# Patient Record
Sex: MALE | Race: WHITE | Employment: OTHER | URBAN - METROPOLITAN AREA
[De-identification: names, ages, dates, MRNs, and addresses within clinical notes are randomized per-mention and may not be internally consistent; named-entity substitution may affect disease eponyms.]

---

## 2017-03-09 ENCOUNTER — OFFICE VISIT (OUTPATIENT)
Dept: ORTHOPEDIC SURGERY | Age: 62
End: 2017-03-09

## 2017-03-09 VITALS
DIASTOLIC BLOOD PRESSURE: 88 MMHG | SYSTOLIC BLOOD PRESSURE: 127 MMHG | BODY MASS INDEX: 18.32 KG/M2 | HEART RATE: 58 BPM | WEIGHT: 128 LBS | HEIGHT: 70 IN

## 2017-03-09 DIAGNOSIS — Z96.642 STATUS POST TOTAL REPLACEMENT OF LEFT HIP: Primary | ICD-10-CM

## 2017-03-09 PROCEDURE — 99213 OFFICE O/P EST LOW 20 MIN: CPT | Performed by: ORTHOPAEDIC SURGERY

## 2017-03-09 PROCEDURE — 73502 X-RAY EXAM HIP UNI 2-3 VIEWS: CPT | Performed by: ORTHOPAEDIC SURGERY

## 2017-03-17 DIAGNOSIS — Z12.12 SCREENING FOR COLORECTAL CANCER: Primary | ICD-10-CM

## 2017-03-17 DIAGNOSIS — Z12.11 SCREENING FOR COLORECTAL CANCER: Primary | ICD-10-CM

## 2017-03-17 LAB
CONTROL: ABNORMAL
HEMOCCULT STL QL: POSITIVE

## 2017-03-20 DIAGNOSIS — Z12.11 SCREENING FOR COLORECTAL CANCER: ICD-10-CM

## 2017-03-20 DIAGNOSIS — Z12.12 SCREENING FOR COLORECTAL CANCER: ICD-10-CM

## 2017-03-20 PROCEDURE — 82274 ASSAY TEST FOR BLOOD FECAL: CPT | Performed by: FAMILY MEDICINE

## 2017-04-28 ENCOUNTER — HOSPITAL ENCOUNTER (OUTPATIENT)
Dept: ENDOSCOPY | Age: 62
Discharge: HOME | End: 2017-04-28
Attending: INTERNAL MEDICINE | Admitting: INTERNAL MEDICINE

## 2017-04-30 DIAGNOSIS — K64.8 INTERNAL HEMORRHOIDS: ICD-10-CM

## 2017-04-30 DIAGNOSIS — K57.30 DIVERTICULOSIS OF LARGE INTESTINE WITHOUT HEMORRHAGE: ICD-10-CM

## 2017-04-30 DIAGNOSIS — K63.5 COLON POLYPS: ICD-10-CM

## 2017-06-15 RX ORDER — ATENOLOL 50 MG/1
TABLET ORAL
Qty: 30 TABLET | Refills: 0 | Status: SHIPPED | OUTPATIENT
Start: 2017-06-15 | End: 2017-07-17 | Stop reason: SDUPTHER

## 2017-06-20 ENCOUNTER — OFFICE VISIT (OUTPATIENT)
Dept: FAMILY MEDICINE CLINIC | Age: 62
End: 2017-06-20

## 2017-06-20 VITALS
HEIGHT: 70 IN | WEIGHT: 136 LBS | DIASTOLIC BLOOD PRESSURE: 70 MMHG | BODY MASS INDEX: 19.47 KG/M2 | SYSTOLIC BLOOD PRESSURE: 110 MMHG

## 2017-06-20 DIAGNOSIS — G20 IDIOPATHIC PARKINSON'S DISEASE (HCC): ICD-10-CM

## 2017-06-20 DIAGNOSIS — I10 ESSENTIAL HYPERTENSION: Primary | ICD-10-CM

## 2017-06-20 LAB
ANION GAP SERPL CALCULATED.3IONS-SCNC: 14 MMOL/L (ref 3–16)
BUN BLDV-MCNC: 13 MG/DL (ref 7–20)
CALCIUM SERPL-MCNC: 9.3 MG/DL (ref 8.3–10.6)
CHLORIDE BLD-SCNC: 101 MMOL/L (ref 99–110)
CHOLESTEROL, TOTAL: 174 MG/DL (ref 0–199)
CO2: 27 MMOL/L (ref 21–32)
CREAT SERPL-MCNC: 0.8 MG/DL (ref 0.8–1.3)
GFR AFRICAN AMERICAN: >60
GFR NON-AFRICAN AMERICAN: >60
GLUCOSE BLD-MCNC: 124 MG/DL (ref 70–99)
HDLC SERPL-MCNC: 54 MG/DL (ref 40–60)
LDL CHOLESTEROL CALCULATED: 95 MG/DL
POTASSIUM SERPL-SCNC: 4.4 MMOL/L (ref 3.5–5.1)
SODIUM BLD-SCNC: 142 MMOL/L (ref 136–145)
TRIGL SERPL-MCNC: 124 MG/DL (ref 0–150)
VLDLC SERPL CALC-MCNC: 25 MG/DL

## 2017-06-20 PROCEDURE — 99213 OFFICE O/P EST LOW 20 MIN: CPT | Performed by: FAMILY MEDICINE

## 2017-06-20 PROCEDURE — 36415 COLL VENOUS BLD VENIPUNCTURE: CPT | Performed by: FAMILY MEDICINE

## 2017-06-20 ASSESSMENT — ENCOUNTER SYMPTOMS: SHORTNESS OF BREATH: 0

## 2017-06-21 ENCOUNTER — TELEPHONE (OUTPATIENT)
Dept: ORTHOPEDIC SURGERY | Age: 62
End: 2017-06-21

## 2017-06-21 RX ORDER — AMOXICILLIN 500 MG/1
500 TABLET, FILM COATED ORAL SEE ADMIN INSTRUCTIONS
Qty: 4 TABLET | Refills: 0 | Status: SHIPPED | OUTPATIENT
Start: 2017-06-21 | End: 2017-12-18

## 2017-06-22 RX ORDER — AMLODIPINE BESYLATE AND BENAZEPRIL HYDROCHLORIDE 5; 20 MG/1; MG/1
CAPSULE ORAL
Qty: 30 CAPSULE | Refills: 5 | Status: SHIPPED | OUTPATIENT
Start: 2017-06-22 | End: 2017-12-19 | Stop reason: SDUPTHER

## 2017-10-21 RX ORDER — ATENOLOL 50 MG/1
TABLET ORAL
Qty: 30 TABLET | Refills: 0 | Status: SHIPPED | OUTPATIENT
Start: 2017-10-21 | End: 2017-11-17 | Stop reason: SDUPTHER

## 2017-10-23 RX ORDER — AMOXICILLIN 500 MG/1
500 TABLET, FILM COATED ORAL SEE ADMIN INSTRUCTIONS
Qty: 4 TABLET | Refills: 0 | Status: SHIPPED | OUTPATIENT
Start: 2017-10-23 | End: 2018-06-21

## 2017-10-23 NOTE — TELEPHONE ENCOUNTER
Patient is going to the dentist tomorrow and will need an antibiotic.      Ph[facility]: [facility]  [phone]    Please call pt when completed  [phone]

## 2017-11-17 RX ORDER — ATENOLOL 50 MG/1
TABLET ORAL
Qty: 30 TABLET | Refills: 0 | Status: SHIPPED | OUTPATIENT
Start: 2017-11-17 | End: 2017-12-19 | Stop reason: SDUPTHER

## 2017-12-18 ASSESSMENT — ENCOUNTER SYMPTOMS: SHORTNESS OF BREATH: 0

## 2017-12-19 ENCOUNTER — OFFICE VISIT (OUTPATIENT)
Dept: FAMILY MEDICINE CLINIC | Age: 62
End: 2017-12-19

## 2017-12-19 VITALS
HEIGHT: 70 IN | DIASTOLIC BLOOD PRESSURE: 78 MMHG | SYSTOLIC BLOOD PRESSURE: 118 MMHG | BODY MASS INDEX: 18.78 KG/M2 | WEIGHT: 131.2 LBS

## 2017-12-19 DIAGNOSIS — G20 IDIOPATHIC PARKINSON'S DISEASE (HCC): ICD-10-CM

## 2017-12-19 DIAGNOSIS — I10 ESSENTIAL HYPERTENSION: Primary | ICD-10-CM

## 2017-12-19 DIAGNOSIS — Z23 NEED FOR INFLUENZA VACCINATION: ICD-10-CM

## 2017-12-19 PROCEDURE — 99213 OFFICE O/P EST LOW 20 MIN: CPT | Performed by: FAMILY MEDICINE

## 2017-12-19 RX ORDER — AMLODIPINE BESYLATE AND BENAZEPRIL HYDROCHLORIDE 5; 20 MG/1; MG/1
CAPSULE ORAL
Qty: 90 CAPSULE | Refills: 1 | Status: SHIPPED | OUTPATIENT
Start: 2017-12-19 | End: 2018-06-14 | Stop reason: SDUPTHER

## 2017-12-19 RX ORDER — ATENOLOL 50 MG/1
TABLET ORAL
Qty: 90 TABLET | Refills: 1 | Status: SHIPPED | OUTPATIENT
Start: 2017-12-19 | End: 2018-05-11 | Stop reason: SDUPTHER

## 2018-05-11 RX ORDER — ATENOLOL 50 MG/1
TABLET ORAL
Qty: 90 TABLET | Refills: 0 | Status: SHIPPED | OUTPATIENT
Start: 2018-05-11 | End: 2018-08-11 | Stop reason: SDUPTHER

## 2018-06-20 ASSESSMENT — ENCOUNTER SYMPTOMS: SHORTNESS OF BREATH: 0

## 2018-06-21 ENCOUNTER — OFFICE VISIT (OUTPATIENT)
Dept: FAMILY MEDICINE CLINIC | Age: 63
End: 2018-06-21

## 2018-06-21 VITALS
HEART RATE: 54 BPM | BODY MASS INDEX: 18.98 KG/M2 | WEIGHT: 132.6 LBS | HEIGHT: 70 IN | DIASTOLIC BLOOD PRESSURE: 79 MMHG | SYSTOLIC BLOOD PRESSURE: 123 MMHG

## 2018-06-21 DIAGNOSIS — I10 ESSENTIAL HYPERTENSION: Primary | ICD-10-CM

## 2018-06-21 DIAGNOSIS — G20 IDIOPATHIC PARKINSON'S DISEASE (HCC): ICD-10-CM

## 2018-06-21 DIAGNOSIS — L85.9 HYPERKERATOSIS: ICD-10-CM

## 2018-06-21 PROCEDURE — 99214 OFFICE O/P EST MOD 30 MIN: CPT | Performed by: FAMILY MEDICINE

## 2018-06-21 PROCEDURE — 17110 DESTRUCTION B9 LES UP TO 14: CPT | Performed by: FAMILY MEDICINE

## 2018-06-21 ASSESSMENT — PATIENT HEALTH QUESTIONNAIRE - PHQ9
2. FEELING DOWN, DEPRESSED OR HOPELESS: 0
SUM OF ALL RESPONSES TO PHQ QUESTIONS 1-9: 0
1. LITTLE INTEREST OR PLEASURE IN DOING THINGS: 0
SUM OF ALL RESPONSES TO PHQ9 QUESTIONS 1 & 2: 0

## 2018-06-22 LAB
ANION GAP SERPL CALCULATED.3IONS-SCNC: 13 MMOL/L (ref 3–16)
BUN BLDV-MCNC: 11 MG/DL (ref 7–20)
CALCIUM SERPL-MCNC: 9.5 MG/DL (ref 8.3–10.6)
CHLORIDE BLD-SCNC: 104 MMOL/L (ref 99–110)
CHOLESTEROL, TOTAL: 169 MG/DL (ref 0–199)
CO2: 26 MMOL/L (ref 21–32)
CREAT SERPL-MCNC: 0.8 MG/DL (ref 0.8–1.3)
GFR AFRICAN AMERICAN: >60
GFR NON-AFRICAN AMERICAN: >60
GLUCOSE BLD-MCNC: 98 MG/DL (ref 70–99)
HDLC SERPL-MCNC: 55 MG/DL (ref 40–60)
LDL CHOLESTEROL CALCULATED: 76 MG/DL
POTASSIUM SERPL-SCNC: 4.7 MMOL/L (ref 3.5–5.1)
SODIUM BLD-SCNC: 143 MMOL/L (ref 136–145)
TRIGL SERPL-MCNC: 192 MG/DL (ref 0–150)
VLDLC SERPL CALC-MCNC: 38 MG/DL

## 2018-08-11 RX ORDER — ATENOLOL 50 MG/1
TABLET ORAL
Qty: 90 TABLET | Refills: 0 | Status: SHIPPED | OUTPATIENT
Start: 2018-08-11 | End: 2018-11-06 | Stop reason: SDUPTHER

## 2018-09-07 RX ORDER — AMLODIPINE BESYLATE AND BENAZEPRIL HYDROCHLORIDE 5; 20 MG/1; MG/1
CAPSULE ORAL
Qty: 90 CAPSULE | Refills: 0 | Status: SHIPPED | OUTPATIENT
Start: 2018-09-07 | End: 2018-12-05 | Stop reason: SDUPTHER

## 2018-11-06 RX ORDER — ATENOLOL 50 MG/1
TABLET ORAL
Qty: 90 TABLET | Refills: 0 | Status: SHIPPED | OUTPATIENT
Start: 2018-11-06 | End: 2018-11-12

## 2018-11-12 RX ORDER — ATENOLOL 50 MG/1
TABLET ORAL
Qty: 90 TABLET | Refills: 0 | Status: SHIPPED | OUTPATIENT
Start: 2018-11-12 | End: 2019-02-10 | Stop reason: SDUPTHER

## 2018-12-17 ASSESSMENT — ENCOUNTER SYMPTOMS: SHORTNESS OF BREATH: 0

## 2018-12-18 ENCOUNTER — OFFICE VISIT (OUTPATIENT)
Dept: FAMILY MEDICINE CLINIC | Age: 63
End: 2018-12-18
Payer: COMMERCIAL

## 2018-12-18 VITALS
WEIGHT: 134 LBS | HEIGHT: 70 IN | DIASTOLIC BLOOD PRESSURE: 74 MMHG | BODY MASS INDEX: 19.18 KG/M2 | SYSTOLIC BLOOD PRESSURE: 116 MMHG

## 2018-12-18 DIAGNOSIS — I10 ESSENTIAL HYPERTENSION: Primary | ICD-10-CM

## 2018-12-18 DIAGNOSIS — G20 IDIOPATHIC PARKINSON'S DISEASE (HCC): ICD-10-CM

## 2018-12-18 DIAGNOSIS — Z23 NEED FOR INFLUENZA VACCINATION: ICD-10-CM

## 2018-12-18 PROCEDURE — 99213 OFFICE O/P EST LOW 20 MIN: CPT | Performed by: FAMILY MEDICINE

## 2019-02-10 RX ORDER — ATENOLOL 50 MG/1
TABLET ORAL
Qty: 90 TABLET | Refills: 1 | Status: SHIPPED | OUTPATIENT
Start: 2019-02-10 | End: 2019-08-03 | Stop reason: SDUPTHER

## 2019-03-04 RX ORDER — AMLODIPINE BESYLATE AND BENAZEPRIL HYDROCHLORIDE 5; 20 MG/1; MG/1
CAPSULE ORAL
Qty: 90 CAPSULE | Refills: 0 | Status: SHIPPED | OUTPATIENT
Start: 2019-03-04 | End: 2019-05-27 | Stop reason: SDUPTHER

## 2019-05-27 RX ORDER — AMLODIPINE BESYLATE AND BENAZEPRIL HYDROCHLORIDE 5; 20 MG/1; MG/1
CAPSULE ORAL
Qty: 90 CAPSULE | Refills: 0 | Status: SHIPPED | OUTPATIENT
Start: 2019-05-27 | End: 2019-08-30 | Stop reason: SDUPTHER

## 2019-06-11 RX ORDER — CARBIDOPA/LEVODOPA 25MG-250MG
1 TABLET ORAL 3 TIMES DAILY
COMMUNITY

## 2019-06-17 ASSESSMENT — ENCOUNTER SYMPTOMS: SHORTNESS OF BREATH: 0

## 2019-06-17 NOTE — PROGRESS NOTES
Subjective:      Patient ID: Shailesh Gamble is a 59 y.o. male. Hypertension   This is a chronic problem. The current episode started more than 1 year ago. The problem is unchanged. The problem is controlled. Pertinent negatives include no chest pain, palpitations, peripheral edema or shortness of breath. Risk factors for coronary artery disease include family history and male gender. Past treatments include ACE inhibitors, beta blockers and calcium channel blockers. The current treatment provides significant improvement. There are no compliance problems. Parkinson's Disease:  Patient sees Dr. Muna Thomas and continues to take Sinemet  QID, Carbidopa 25 mg TID. Review of Systems   Constitutional: Negative for chills and fever. Respiratory: Negative for shortness of breath. Cardiovascular: Negative for chest pain, palpitations and leg swelling. /70   Ht 5' 10\" (1.778 m)   Wt 131 lb (59.4 kg)   BMI 18.80 kg/m²    Objective:   Physical Exam   Constitutional: He is oriented to person, place, and time. He appears well-developed and well-nourished. No distress. HENT:   Head: Normocephalic. Right Ear: External ear normal.   Left Ear: External ear normal.   Mouth/Throat: Oropharynx is clear and moist. No oropharyngeal exudate. Neck: No JVD present. No thyromegaly present. Cardiovascular: Normal rate, regular rhythm, normal heart sounds and intact distal pulses. No murmur heard. Pulmonary/Chest: Effort normal and breath sounds normal. He has no wheezes. He has no rales. Musculoskeletal: He exhibits no edema. Lymphadenopathy:     He has no cervical adenopathy. Neurological: He is alert and oriented to person, place, and time.        Assessment:      Hypertension  Parkinson's Disease      Plan:       Chem 7, Lipid Panel  Refilled medications  RTO 6 months for Hypertension

## 2019-06-18 ENCOUNTER — OFFICE VISIT (OUTPATIENT)
Dept: FAMILY MEDICINE CLINIC | Age: 64
End: 2019-06-18
Payer: COMMERCIAL

## 2019-06-18 VITALS
DIASTOLIC BLOOD PRESSURE: 70 MMHG | WEIGHT: 131 LBS | BODY MASS INDEX: 18.75 KG/M2 | SYSTOLIC BLOOD PRESSURE: 104 MMHG | HEIGHT: 70 IN

## 2019-06-18 DIAGNOSIS — G20 IDIOPATHIC PARKINSON'S DISEASE (HCC): ICD-10-CM

## 2019-06-18 DIAGNOSIS — I10 ESSENTIAL HYPERTENSION: Primary | ICD-10-CM

## 2019-06-18 LAB
ANION GAP SERPL CALCULATED.3IONS-SCNC: 13 MMOL/L (ref 3–16)
BUN BLDV-MCNC: 12 MG/DL (ref 7–20)
CALCIUM SERPL-MCNC: 9.5 MG/DL (ref 8.3–10.6)
CHLORIDE BLD-SCNC: 101 MMOL/L (ref 99–110)
CHOLESTEROL, TOTAL: 169 MG/DL (ref 0–199)
CO2: 25 MMOL/L (ref 21–32)
CREAT SERPL-MCNC: 0.7 MG/DL (ref 0.8–1.3)
GFR AFRICAN AMERICAN: >60
GFR NON-AFRICAN AMERICAN: >60
GLUCOSE BLD-MCNC: 100 MG/DL (ref 70–99)
HDLC SERPL-MCNC: 55 MG/DL (ref 40–60)
LDL CHOLESTEROL CALCULATED: 88 MG/DL
POTASSIUM SERPL-SCNC: 4.7 MMOL/L (ref 3.5–5.1)
SODIUM BLD-SCNC: 139 MMOL/L (ref 136–145)
TRIGL SERPL-MCNC: 128 MG/DL (ref 0–150)
VLDLC SERPL CALC-MCNC: 26 MG/DL

## 2019-06-18 PROCEDURE — 99214 OFFICE O/P EST MOD 30 MIN: CPT | Performed by: FAMILY MEDICINE

## 2019-09-05 ENCOUNTER — OFFICE VISIT (OUTPATIENT)
Dept: FAMILY MEDICINE CLINIC | Age: 64
End: 2019-09-05
Payer: COMMERCIAL

## 2019-09-05 VITALS
BODY MASS INDEX: 18.04 KG/M2 | DIASTOLIC BLOOD PRESSURE: 74 MMHG | HEIGHT: 70 IN | HEART RATE: 62 BPM | SYSTOLIC BLOOD PRESSURE: 112 MMHG | WEIGHT: 126 LBS

## 2019-09-05 DIAGNOSIS — K64.4 EXTERNAL HEMORRHOIDS: Primary | ICD-10-CM

## 2019-09-05 PROCEDURE — 99213 OFFICE O/P EST LOW 20 MIN: CPT | Performed by: FAMILY MEDICINE

## 2019-09-05 RX ORDER — HYDROCORTISONE ACETATE 25 MG/1
25 SUPPOSITORY RECTAL EVERY 12 HOURS
Qty: 14 SUPPOSITORY | Refills: 1 | Status: SHIPPED | OUTPATIENT
Start: 2019-09-05 | End: 2019-09-19

## 2019-09-05 ASSESSMENT — ENCOUNTER SYMPTOMS
ANAL BLEEDING: 1
RECTAL PAIN: 1

## 2019-12-16 ASSESSMENT — ENCOUNTER SYMPTOMS: SHORTNESS OF BREATH: 0

## 2019-12-18 ENCOUNTER — OFFICE VISIT (OUTPATIENT)
Dept: FAMILY MEDICINE CLINIC | Age: 64
End: 2019-12-18
Payer: COMMERCIAL

## 2019-12-18 VITALS
DIASTOLIC BLOOD PRESSURE: 80 MMHG | HEIGHT: 70 IN | BODY MASS INDEX: 18.87 KG/M2 | WEIGHT: 131.8 LBS | SYSTOLIC BLOOD PRESSURE: 114 MMHG

## 2019-12-18 DIAGNOSIS — Z23 NEED FOR INFLUENZA VACCINATION: ICD-10-CM

## 2019-12-18 DIAGNOSIS — G20 IDIOPATHIC PARKINSON'S DISEASE (HCC): ICD-10-CM

## 2019-12-18 DIAGNOSIS — I10 ESSENTIAL HYPERTENSION: Primary | ICD-10-CM

## 2019-12-18 PROCEDURE — 99213 OFFICE O/P EST LOW 20 MIN: CPT | Performed by: FAMILY MEDICINE

## 2019-12-18 RX ORDER — AMLODIPINE BESYLATE AND BENAZEPRIL HYDROCHLORIDE 5; 20 MG/1; MG/1
CAPSULE ORAL
Qty: 90 CAPSULE | Refills: 1 | Status: SHIPPED | OUTPATIENT
Start: 2019-12-18 | End: 2020-06-01

## 2020-01-24 RX ORDER — ATENOLOL 50 MG/1
TABLET ORAL
Qty: 90 TABLET | Refills: 0 | Status: SHIPPED | OUTPATIENT
Start: 2020-01-24 | End: 2020-04-19

## 2020-06-14 ASSESSMENT — ENCOUNTER SYMPTOMS: SHORTNESS OF BREATH: 0

## 2020-06-16 ENCOUNTER — OFFICE VISIT (OUTPATIENT)
Dept: FAMILY MEDICINE CLINIC | Age: 65
End: 2020-06-16
Payer: COMMERCIAL

## 2020-06-16 VITALS
DIASTOLIC BLOOD PRESSURE: 74 MMHG | HEIGHT: 70 IN | WEIGHT: 131.8 LBS | SYSTOLIC BLOOD PRESSURE: 116 MMHG | BODY MASS INDEX: 18.87 KG/M2

## 2020-06-16 LAB
ANION GAP SERPL CALCULATED.3IONS-SCNC: 12 MMOL/L (ref 3–16)
BUN BLDV-MCNC: 15 MG/DL (ref 7–20)
CALCIUM SERPL-MCNC: 9.1 MG/DL (ref 8.3–10.6)
CHLORIDE BLD-SCNC: 100 MMOL/L (ref 99–110)
CHOLESTEROL, TOTAL: 147 MG/DL (ref 0–199)
CO2: 27 MMOL/L (ref 21–32)
CREAT SERPL-MCNC: 0.8 MG/DL (ref 0.8–1.3)
GFR AFRICAN AMERICAN: >60
GFR NON-AFRICAN AMERICAN: >60
GLUCOSE BLD-MCNC: 85 MG/DL (ref 70–99)
HDLC SERPL-MCNC: 54 MG/DL (ref 40–60)
LDL CHOLESTEROL CALCULATED: 76 MG/DL
POTASSIUM SERPL-SCNC: 4.4 MMOL/L (ref 3.5–5.1)
SODIUM BLD-SCNC: 139 MMOL/L (ref 136–145)
TRIGL SERPL-MCNC: 84 MG/DL (ref 0–150)
VLDLC SERPL CALC-MCNC: 17 MG/DL

## 2020-06-16 PROCEDURE — 99214 OFFICE O/P EST MOD 30 MIN: CPT | Performed by: FAMILY MEDICINE

## 2020-07-12 RX ORDER — ATENOLOL 50 MG/1
TABLET ORAL
Qty: 90 TABLET | Refills: 0 | Status: SHIPPED | OUTPATIENT
Start: 2020-07-12 | End: 2020-10-07

## 2020-12-15 ASSESSMENT — ENCOUNTER SYMPTOMS: SHORTNESS OF BREATH: 0

## 2020-12-16 ENCOUNTER — OFFICE VISIT (OUTPATIENT)
Dept: FAMILY MEDICINE CLINIC | Age: 65
End: 2020-12-16
Payer: COMMERCIAL

## 2020-12-16 VITALS
TEMPERATURE: 97 F | WEIGHT: 128 LBS | HEIGHT: 70 IN | DIASTOLIC BLOOD PRESSURE: 72 MMHG | SYSTOLIC BLOOD PRESSURE: 114 MMHG | BODY MASS INDEX: 18.32 KG/M2

## 2020-12-16 PROCEDURE — 99213 OFFICE O/P EST LOW 20 MIN: CPT | Performed by: FAMILY MEDICINE

## 2020-12-16 RX ORDER — RASAGILINE 1 MG/1
0.5 TABLET ORAL DAILY
COMMUNITY

## 2020-12-16 RX ORDER — ATENOLOL 50 MG/1
50 TABLET ORAL DAILY
Qty: 90 TABLET | Refills: 1 | Status: SHIPPED | OUTPATIENT
Start: 2020-12-16 | End: 2021-06-15 | Stop reason: SDUPTHER

## 2020-12-16 RX ORDER — AMLODIPINE BESYLATE AND BENAZEPRIL HYDROCHLORIDE 5; 20 MG/1; MG/1
1 CAPSULE ORAL DAILY
Qty: 90 CAPSULE | Refills: 1 | Status: SHIPPED | OUTPATIENT
Start: 2020-12-16 | End: 2021-06-15 | Stop reason: SDUPTHER

## 2021-06-14 ASSESSMENT — ENCOUNTER SYMPTOMS: SHORTNESS OF BREATH: 0

## 2021-06-15 ENCOUNTER — OFFICE VISIT (OUTPATIENT)
Dept: FAMILY MEDICINE CLINIC | Age: 66
End: 2021-06-15
Payer: COMMERCIAL

## 2021-06-15 VITALS
BODY MASS INDEX: 18.3 KG/M2 | WEIGHT: 127.8 LBS | SYSTOLIC BLOOD PRESSURE: 122 MMHG | OXYGEN SATURATION: 98 % | HEART RATE: 70 BPM | HEIGHT: 70 IN | DIASTOLIC BLOOD PRESSURE: 68 MMHG | RESPIRATION RATE: 16 BRPM

## 2021-06-15 DIAGNOSIS — G20 IDIOPATHIC PARKINSON'S DISEASE (HCC): ICD-10-CM

## 2021-06-15 DIAGNOSIS — I10 ESSENTIAL HYPERTENSION: Primary | ICD-10-CM

## 2021-06-15 LAB
ANION GAP SERPL CALCULATED.3IONS-SCNC: 10 MMOL/L (ref 3–16)
BUN BLDV-MCNC: 11 MG/DL (ref 7–20)
CALCIUM SERPL-MCNC: 9.2 MG/DL (ref 8.3–10.6)
CHLORIDE BLD-SCNC: 103 MMOL/L (ref 99–110)
CHOLESTEROL, TOTAL: 176 MG/DL (ref 0–199)
CO2: 27 MMOL/L (ref 21–32)
CREAT SERPL-MCNC: 0.8 MG/DL (ref 0.8–1.3)
GFR AFRICAN AMERICAN: >60
GFR NON-AFRICAN AMERICAN: >60
GLUCOSE BLD-MCNC: 91 MG/DL (ref 70–99)
HDLC SERPL-MCNC: 62 MG/DL (ref 40–60)
LDL CHOLESTEROL CALCULATED: 91 MG/DL
POTASSIUM SERPL-SCNC: 4.5 MMOL/L (ref 3.5–5.1)
SODIUM BLD-SCNC: 140 MMOL/L (ref 136–145)
TRIGL SERPL-MCNC: 116 MG/DL (ref 0–150)
VLDLC SERPL CALC-MCNC: 23 MG/DL

## 2021-06-15 PROCEDURE — 99214 OFFICE O/P EST MOD 30 MIN: CPT | Performed by: FAMILY MEDICINE

## 2021-06-15 RX ORDER — ATENOLOL 50 MG/1
50 TABLET ORAL DAILY
Qty: 90 TABLET | Refills: 1 | Status: SHIPPED | OUTPATIENT
Start: 2021-06-15 | End: 2021-11-29

## 2021-06-15 RX ORDER — AMLODIPINE BESYLATE AND BENAZEPRIL HYDROCHLORIDE 5; 20 MG/1; MG/1
1 CAPSULE ORAL DAILY
Qty: 90 CAPSULE | Refills: 1 | Status: SHIPPED | OUTPATIENT
Start: 2021-06-15 | End: 2021-11-29

## 2021-06-15 SDOH — ECONOMIC STABILITY: FOOD INSECURITY: WITHIN THE PAST 12 MONTHS, THE FOOD YOU BOUGHT JUST DIDN'T LAST AND YOU DIDN'T HAVE MONEY TO GET MORE.: NEVER TRUE

## 2021-06-15 SDOH — ECONOMIC STABILITY: FOOD INSECURITY: WITHIN THE PAST 12 MONTHS, YOU WORRIED THAT YOUR FOOD WOULD RUN OUT BEFORE YOU GOT MONEY TO BUY MORE.: NEVER TRUE

## 2021-06-15 ASSESSMENT — SOCIAL DETERMINANTS OF HEALTH (SDOH): HOW HARD IS IT FOR YOU TO PAY FOR THE VERY BASICS LIKE FOOD, HOUSING, MEDICAL CARE, AND HEATING?: NOT HARD AT ALL

## 2021-08-16 ENCOUNTER — TELEPHONE (OUTPATIENT)
Dept: FAMILY MEDICINE CLINIC | Age: 66
End: 2021-08-16

## 2021-08-16 NOTE — TELEPHONE ENCOUNTER
----- Message from Candace Vences sent at 8/16/2021 12:15 PM EDT -----  Subject: Appointment Request    Reason for Call: Urgent Skin Problem    QUESTIONS  Type of Appointment? Established Patient  Reason for appointment request? Requested Provider unavailable - Meghann Cerda  Additional Information for Provider? Patient has a mole on his right arm   that he would like Dr. Ira Patel to take a look at to make sure it isn't   anything serious.   ---------------------------------------------------------------------------  --------------  CALL BACK INFO  What is the best way for the office to contact you? OK to leave message on   voicemail  Preferred Call Back Phone Number? [phone]  ---------------------------------------------------------------------------  --------------  SCRIPT ANSWERS  Relationship to Patient? Self  Are you having swelling in your throat or face? No  Are you having difficulty breathing? No  Have the symptoms worsened or spread in the last day? Yes  Have you been diagnosed with, awaiting test results for, or told that you   are suspected of having COVID-19 (Coronavirus)? (If patient has tested   negative or was tested as a requirement for work, school, or travel and   not based on symptoms, answer no)? No  Do you currently have flu-like symptoms including fever or chills, cough,   shortness of breath, difficulty breathing, or new loss of taste or smell? No  Have you had close contact with someone with COVID-19 in the last 14 days? No  (Service Expert  click yes below to proceed with [employer] As Usual   Scheduling)?  Yes

## 2021-08-23 NOTE — PROGRESS NOTES
Subjective:      Patient ID: Luis Gunter is a 73 y.o. male. HPI     Mole on Left Forearm:  Patient found a spot on the left forearm that is raised. He feels that it is improving over time but it is \"crusty\" and he wanted to have it checked. It itches slightly but is not draining or bleeding. Review of Systems   Constitutional: Negative for chills and fever. Skin: Positive for wound. /76   Pulse 64   Resp 18   Ht 5' 10\" (1.778 m)   Wt 122 lb (55.3 kg)   SpO2 97%   BMI 17.51 kg/m²    Objective:   Physical Exam  Constitutional:       General: He is not in acute distress. Appearance: Normal appearance. He is normal weight. He is not ill-appearing or toxic-appearing. Skin:     Comments: Left dorsal forearm with a 0.25 cm raised, scaled lesion consistent with actinic keratosis. Neurological:      Mental Status: He is alert and oriented to person, place, and time. Assessment:      Actinic Keratosis       Plan:      Cryotherapy applied for 40 seconds.     Pneumovax Shot Given  Abdominal US for AAA screen  RTO 4 months for Hypertension         ALYSA JUNIOR DO

## 2021-08-24 ENCOUNTER — OFFICE VISIT (OUTPATIENT)
Dept: FAMILY MEDICINE CLINIC | Age: 66
End: 2021-08-24
Payer: COMMERCIAL

## 2021-08-24 VITALS
SYSTOLIC BLOOD PRESSURE: 122 MMHG | DIASTOLIC BLOOD PRESSURE: 76 MMHG | HEIGHT: 70 IN | BODY MASS INDEX: 17.47 KG/M2 | OXYGEN SATURATION: 97 % | RESPIRATION RATE: 18 BRPM | HEART RATE: 64 BPM | WEIGHT: 122 LBS

## 2021-08-24 DIAGNOSIS — Z13.6 SCREENING FOR AAA (ABDOMINAL AORTIC ANEURYSM): ICD-10-CM

## 2021-08-24 DIAGNOSIS — Z23 NEED FOR PNEUMOCOCCAL VACCINATION: ICD-10-CM

## 2021-08-24 DIAGNOSIS — L57.0 ACTINIC KERATOSIS: Primary | ICD-10-CM

## 2021-08-24 PROCEDURE — 99213 OFFICE O/P EST LOW 20 MIN: CPT | Performed by: FAMILY MEDICINE

## 2021-08-24 PROCEDURE — G0009 ADMIN PNEUMOCOCCAL VACCINE: HCPCS | Performed by: FAMILY MEDICINE

## 2021-08-24 PROCEDURE — 90732 PPSV23 VACC 2 YRS+ SUBQ/IM: CPT | Performed by: FAMILY MEDICINE

## 2021-08-24 PROCEDURE — 17000 DESTRUCT PREMALG LESION: CPT | Performed by: FAMILY MEDICINE

## 2021-08-24 NOTE — PATIENT INSTRUCTIONS
Patient Education        Actinic Keratosis: Care Instructions  Your Care Instructions  Actinic keratosis is a skin growth caused by sun damage. It can turn into skin cancer, but this isn't common. Actinic keratoses, also called solar keratoses, are small red, brown, or skin-colored scaly patches. They are most common on the face, neck, hands, and forearms. Your doctor can remove these growths by freezing or scraping them off or by putting medicines on them. Follow-up care is a key part of your treatment and safety. Be sure to make and go to all appointments, and call your doctor if you are having problems. It's also a good idea to know your test results and keep a list of the medicines you take. How can you care for yourself at home? · If your doctor told you how to care for the treated area, follow your doctor's instructions. If you did not get instructions, follow this general advice:  ? Wash around the area with clean water 2 times a day. Don't use hydrogen peroxide or alcohol, which can slow healing. ? You may cover the area with a thin layer of petroleum jelly, such as Vaseline, and a nonstick bandage. ? Apply more petroleum jelly and replace the bandage as needed. To prevent actinic keratosis  · Always wear sunscreen on exposed skin. Make sure to use a broad-spectrum sunscreen that has a sun protection factor (SPF) of 30 or higher. Use it every day, even when it is cloudy. · Wear long sleeves, a hat, and pants if you are going to be outdoors for a long time. · Avoid the sun between 10 a.m. and 4 p.m., the peak time for UV rays. · Do not use tanning booths or sunlamps. When should you call for help? Watch closely for changes in your health, and be sure to contact your doctor if:    · You have symptoms of infection, such as:  ? Increased pain, swelling, warmth, or redness. ? Red streaks leading from the area. ? Pus draining from the area. ? A fever. Where can you learn more?   Go to [email or web address] and sign in to your [employer] account. Enter L364 in the [facility] box to learn more about \"Actinic Keratosis: Care Instructions. \"     If you do not have an account, please click on the \"Sign Up Now\" link. Current as of: March 3, 2021               Content Version: 12.9  © 7683-6153 [employer], [facility]. Care instructions adapted under license by [facility] ([facility]). If you have questions about a medical condition or this instruction, always ask your healthcare professional. [place] [address] any warranty or liability for your use of this information.

## 2021-12-13 ASSESSMENT — ENCOUNTER SYMPTOMS: SHORTNESS OF BREATH: 0

## 2021-12-13 NOTE — PROGRESS NOTES
Subjective:      Patient ID: Brittany Nam is a 73 y.o. male. Hypertension  This is a chronic problem. The current episode started more than 1 year ago. The problem is unchanged. The problem is controlled. Pertinent negatives include no chest pain, palpitations, peripheral edema or shortness of breath. Risk factors for coronary artery disease include family history and male gender. Past treatments include ACE inhibitors, beta blockers and calcium channel blockers. The current treatment provides significant improvement. There are no compliance problems. Parkinson's Disease:  Patient sees Dr. Rody Benitze and continues to take Sinemet  QID and Rasagiline (Azilect) 1 mg 1/2 daily. Review of Systems   Constitutional: Negative for chills and fever. Respiratory: Negative for shortness of breath. Cardiovascular: Negative for chest pain, palpitations and leg swelling. /78   Pulse 61   Wt 131 lb (59.4 kg)   SpO2 99%   BMI 18.80 kg/m²    Objective:   Physical Exam  Constitutional:       General: He is not in acute distress. Appearance: He is well-developed. HENT:      Head: Normocephalic. Right Ear: External ear normal.      Left Ear: External ear normal.      Mouth/Throat:      Pharynx: No oropharyngeal exudate. Neck:      Thyroid: No thyromegaly. Vascular: No JVD. Cardiovascular:      Rate and Rhythm: Normal rate and regular rhythm. Heart sounds: Normal heart sounds. No murmur heard. Pulmonary:      Effort: Pulmonary effort is normal.      Breath sounds: Normal breath sounds. No wheezing or rales. Lymphadenopathy:      Cervical: No cervical adenopathy. Neurological:      Mental Status: He is alert and oriented to person, place, and time.          Assessment:      Hypertension  Parkinson's Disease      Plan:       Refilled medications  Flu Shot Declined   Abdominal US for AAA screen   RTO 6 months for Hypertension

## 2021-12-14 ENCOUNTER — OFFICE VISIT (OUTPATIENT)
Dept: FAMILY MEDICINE CLINIC | Age: 66
End: 2021-12-14
Payer: COMMERCIAL

## 2021-12-14 VITALS
OXYGEN SATURATION: 99 % | WEIGHT: 131 LBS | DIASTOLIC BLOOD PRESSURE: 78 MMHG | SYSTOLIC BLOOD PRESSURE: 110 MMHG | BODY MASS INDEX: 18.8 KG/M2 | HEART RATE: 61 BPM

## 2021-12-14 DIAGNOSIS — L98.9 FACIAL LESION: ICD-10-CM

## 2021-12-14 DIAGNOSIS — Z23 NEED FOR INFLUENZA VACCINATION: ICD-10-CM

## 2021-12-14 DIAGNOSIS — Z13.6 SCREENING FOR AAA (ABDOMINAL AORTIC ANEURYSM): ICD-10-CM

## 2021-12-14 DIAGNOSIS — I10 ESSENTIAL HYPERTENSION: Primary | ICD-10-CM

## 2021-12-14 DIAGNOSIS — G20 IDIOPATHIC PARKINSON'S DISEASE (HCC): ICD-10-CM

## 2021-12-14 PROCEDURE — 99213 OFFICE O/P EST LOW 20 MIN: CPT | Performed by: FAMILY MEDICINE

## 2021-12-14 RX ORDER — CARBIDOPA/LEVODOPA 25MG-250MG
1 TABLET ORAL 4 TIMES DAILY
Qty: 90 TABLET | Status: CANCELLED | OUTPATIENT
Start: 2021-12-14

## 2022-01-06 ENCOUNTER — HOSPITAL ENCOUNTER (OUTPATIENT)
Dept: ULTRASOUND IMAGING | Age: 67
Discharge: HOME | End: 2022-01-06
Payer: MEDICARE

## 2022-01-06 DIAGNOSIS — Z13.6 SCREENING FOR AAA (ABDOMINAL AORTIC ANEURYSM): ICD-10-CM

## 2022-01-06 PROCEDURE — 76706 US ABDL AORTA SCREEN AAA: CPT

## 2022-02-22 RX ORDER — ATENOLOL 50 MG/1
TABLET ORAL
Qty: 90 TABLET | Refills: 0 | Status: SHIPPED | OUTPATIENT
Start: 2022-02-22 | End: 2022-05-22

## 2022-03-20 RX ORDER — AMLODIPINE BESYLATE AND BENAZEPRIL HYDROCHLORIDE 5; 20 MG/1; MG/1
CAPSULE ORAL
Qty: 90 CAPSULE | Refills: 0 | Status: SHIPPED | OUTPATIENT
Start: 2022-03-20 | End: 2022-06-14 | Stop reason: SDUPTHER

## 2022-04-03 NOTE — PROGRESS NOTES
Subjective:      Patient ID: Luke Hernandez is a 73 y.o. male. HPI     Hip Pain:  Patient has had hip pain for years but worse over the last year and especially the last 2 months. He states that about 2 weeks ago, he slipped while working in the yard and made it even worse. He has been taking Ibuprofen 800 mg for temporary relief. He will take Tylenol on occasion as well. His left hip was replaced in 2016. He was previously told that his right hip is bone on bone. Review of Systems   Constitutional: Negative for chills and fever. Musculoskeletal: Positive for arthralgias. /82   Ht 5' 10\" (1.778 m)   Wt 130 lb 12.8 oz (59.3 kg)   BMI 18.77 kg/m²    Objective:   Physical Exam  Constitutional:       General: He is not in acute distress. Appearance: Normal appearance. He is not ill-appearing or toxic-appearing. Musculoskeletal:      Comments: Right hip with no pain to palpation. Pain with extremes of flexion, abduction and internal / external rotation. Neurological:      Mental Status: He is alert and oriented to person, place, and time. Assessment:      Right Hip Pain       Plan:      Rx Ultram 50 mg every 6 hrs as needed for pain (#60)  Rx Ibuprofen 800 mg TID prn  Referral back to Dr. Tania Cline for probable hip replacement. I recommended the COVID booster  Recommended that patient have an AWV.    RTO 3 months for Hypertension         ALYSA JUNIOR DO

## 2022-04-04 ENCOUNTER — OFFICE VISIT (OUTPATIENT)
Dept: FAMILY MEDICINE CLINIC | Age: 67
End: 2022-04-04
Payer: MEDICARE

## 2022-04-04 VITALS
SYSTOLIC BLOOD PRESSURE: 110 MMHG | WEIGHT: 130.8 LBS | HEIGHT: 70 IN | DIASTOLIC BLOOD PRESSURE: 82 MMHG | BODY MASS INDEX: 18.73 KG/M2

## 2022-04-04 DIAGNOSIS — M16.11 PRIMARY OSTEOARTHRITIS OF RIGHT HIP: Primary | ICD-10-CM

## 2022-04-04 PROCEDURE — 99213 OFFICE O/P EST LOW 20 MIN: CPT | Performed by: FAMILY MEDICINE

## 2022-04-04 RX ORDER — TRAMADOL HYDROCHLORIDE 50 MG/1
50 TABLET ORAL EVERY 6 HOURS PRN
Qty: 60 TABLET | Refills: 0 | Status: SHIPPED | OUTPATIENT
Start: 2022-04-04 | End: 2022-04-13

## 2022-04-04 RX ORDER — IBUPROFEN 800 MG/1
800 TABLET ORAL
Qty: 90 TABLET | Refills: 0 | Status: ON HOLD | OUTPATIENT
Start: 2022-04-04 | End: 2022-04-25 | Stop reason: SDUPTHER

## 2022-04-04 ASSESSMENT — PATIENT HEALTH QUESTIONNAIRE - PHQ9
10. IF YOU CHECKED OFF ANY PROBLEMS, HOW DIFFICULT HAVE THESE PROBLEMS MADE IT FOR YOU TO DO YOUR WORK, TAKE CARE OF THINGS AT HOME, OR GET ALONG WITH OTHER PEOPLE: 3
SUM OF ALL RESPONSES TO PHQ9 QUESTIONS 1 & 2: 0
2. FEELING DOWN, DEPRESSED OR HOPELESS: 0
6. FEELING BAD ABOUT YOURSELF - OR THAT YOU ARE A FAILURE OR HAVE LET YOURSELF OR YOUR FAMILY DOWN: 0
9. THOUGHTS THAT YOU WOULD BE BETTER OFF DEAD, OR OF HURTING YOURSELF: 0
3. TROUBLE FALLING OR STAYING ASLEEP: 1
SUM OF ALL RESPONSES TO PHQ QUESTIONS 1-9: 3
7. TROUBLE CONCENTRATING ON THINGS, SUCH AS READING THE NEWSPAPER OR WATCHING TELEVISION: 1
SUM OF ALL RESPONSES TO PHQ QUESTIONS 1-9: 3
4. FEELING TIRED OR HAVING LITTLE ENERGY: 1
8. MOVING OR SPEAKING SO SLOWLY THAT OTHER PEOPLE COULD HAVE NOTICED. OR THE OPPOSITE, BEING SO FIGETY OR RESTLESS THAT YOU HAVE BEEN MOVING AROUND A LOT MORE THAN USUAL: 0
5. POOR APPETITE OR OVEREATING: 0
1. LITTLE INTEREST OR PLEASURE IN DOING THINGS: 0

## 2022-04-06 ENCOUNTER — OFFICE VISIT (OUTPATIENT)
Dept: ORTHOPEDIC SURGERY | Age: 67
End: 2022-04-06
Payer: MEDICARE

## 2022-04-06 ENCOUNTER — PREP FOR PROCEDURE (OUTPATIENT)
Dept: ORTHOPEDIC SURGERY | Age: 67
End: 2022-04-06

## 2022-04-06 VITALS — BODY MASS INDEX: 18.47 KG/M2 | HEIGHT: 70 IN | WEIGHT: 129 LBS

## 2022-04-06 DIAGNOSIS — M25.551 HIP PAIN, RIGHT: ICD-10-CM

## 2022-04-06 DIAGNOSIS — M16.11 PRIMARY OSTEOARTHRITIS OF RIGHT HIP: Primary | ICD-10-CM

## 2022-04-06 DIAGNOSIS — Z01.818 PREOP TESTING: Primary | ICD-10-CM

## 2022-04-06 PROCEDURE — 99203 OFFICE O/P NEW LOW 30 MIN: CPT | Performed by: ORTHOPAEDIC SURGERY

## 2022-04-06 NOTE — PROGRESS NOTES
Annetta Fu  [id number]  April 6, 2022    Chief Complaint   Patient presents with    Hip Pain     Right       History: The patient is a 71-year-old gentleman who is here for evaluation of his right hip. I did perform a left total hip arthroplasty on the patient back in 2016. He recovered uneventfully. The patient has been having increasing right hip pain for the past year. It is now rather severe. The patient reports having difficulty getting out of bed due to the severe right hip pain. He has been taking 800 mg of ibuprofen 3 times a day. He has also been taking tramadol. He feels that the right hip issues are affecting his back. The patient does have idiopathic Parkinson's disease and he is on Sinemet 4 times daily. The Parkinson's is stable. This is a consult from Oanh Jin DO for right hip pain      The patient's  past medical history, medications, allergies,  family history, social history, and have been reviewed, and dated and are recorded in the chart. Pertinent items are noted in HPI. Review of systems reviewed from Pertinent History Form dated on 4/6/2022 and available in the patient's chart under the Media tab. Vitals:  Ht 5' 10\" (1.778 m)   Wt 129 lb (58.5 kg)   BMI 18.51 kg/m²     Physical: Physical: Mr. Annetta Fu appears well, he is in no apparent distress, he demonstrates appropriate mood & affect. He is alert and oriented to person, place and time. He has severe pain with internal rotation of the right hip. Range of motion of the right hip is : 40 degrees abduction, 30 degrees adduction, 35 degrees of external rotation and 5 degrees of internal rotation. Range of motion of the opposite hip is full. He is non tender laterally about the hips. Trendelenburg test is negative bilaterally. Glenora Meter test is negative bilaterally. He is non tender about the Sacroiliac joint bilaterally. Leg length discrepancy: left longer by .4 cm.    Examination of the skin reveals no rashes, ulcerations, or lesions, bilaterally in the lower extremities. Sensation to both lower extremities is grossly intact. Exam of both feet reveals pedal pulses intact and brisk cap refill. Patient is able to dorsiflex and wiggle all toes. Deep tendon reflexes of the lower extremities are normal and symmetric. He is non tender to palpation of the lumbar spine. X-rays: AP pelvis and 2 views of the right hip were obtained and demonstrate severe osteoarthritis of the right hip. There is flattening of the femoral head with cysts in the superior lateral aspect of the femoral head. There are large periarticular osteophytes as well. Impression: right Hip Osteoarthritis      Plan: At this time, the patient will be scheduled for a right total hip arthroplasty. All risks including but not limited to blood loss, infection, persistent pain,stiffness, dislocation, weakness,loosening, leg length discrepancy, deep vein thrombosis,neurovascular injury, and the risks of anesthesia were discussed. The patient understands all risks and benefits of the procedure and agrees to proceed. The patient will see his primary care physician,ALYSA JUNIOR DO, for medical clearance. If the patient is on NSAIDS or blood thinners these medications will need to be discontinued one week prior to surgery. Will plan on 81mg ASA BID for 35 days post-op.

## 2022-04-06 NOTE — PROGRESS NOTES
RAPT  RISK ASSESSMENT and PREDICTION TOOL    Name: Olvin Crowley  YOB: 1955  Surgeon: Anival Taveras MD         Value Score    1). What is your age group? 50 - 65 years  = 2      66 - 75 years = 1     > 75 years = 0       Your score = 2   2). Gender? Male = 2     Female = 1       Your score = 2   3). How far on average can you walk? Two blocks or more (+/- rest) = 2    (a block is 200 rxwegs=222 ft)  1 - 2 blocks (+/- rest) = 1     Housebound (most of time) = 0       Your score = 1   4). Which gait aid do you use? None = 2    (more often than not) Single-point stick = 1     Crutches/walker = 0       Your score = 0   5). Do you use community supports? None or one per week = 1    (home help, meals on wheels, district nursing) Two or more per week = 0       Your score = 1   6). Will you live with someone who can care for you after your operation? yes = 3     no = 0       Your score = 3    Your Total Score (out of 12) = 9       Key: Destination at discharge from acute care predicted by score. Score < 6  = extended inpatient rehabilitation  Score 6 - 9  = additional intervention to discharge directly home (Rehab in the home)  Score > 9  = directly home      Patient's Preference Prediction Score Agreed destination   open 9 home   Olvin Crowley  Date: 4/6/22     Wife at home after surgery.

## 2022-04-06 NOTE — LETTER
[facility]: [phone] F: [phone]  Surgery Scheduling Form:  DEMOGRAPHICS:                                                                                                              .    Patient Name:  Benigno Galan    Patient :  1955   Patient SS#:        Patient Phone:  [phone] (home) [phone] (work)     Patient Address:  [address] [place] [address]    Insurance:    Payor/Plan Subscr  Sex Relation Sub. Ins. ID Effective Group Num   1. 900 Mariola Nugent, 1955 Male Self [id number]  [id number]     DIAGNOSIS & PROCEDURE:                                                                                            .    Diagnosis:  Osteoarthritis- right hip M16.11    Operation:  Total hip replacement- right hip  [address]92    Location:  [facility]    Surgeon:  Joycelyn Bustillos    SCHEDULING INFORMATION:                                                                                         .    Requested Date:  22 OR Time: 7:30 am  Patient Arrival Time: 6:00 am     OR Time Required:  105  Minutes     1 hour 45 minutes    Anesthesia:  General    SA Required:  Yes x 2    Equipment:  Depuy Advanced    Status:  Same day admit     PAT Required:  Yes  COVID19 test:  N/A    Latex Allergy:  no Defibrilator or Pacemaker:  no    Isolation Precautions:  no                      Richie Escobedo MD     22   BILLING INFORMATION:                                                                                                    .                          CPT Code Modifier  Total hip    Prior auth:pending   Name: Benigno Galan  : 1955  Procedure: Total hip replacement- right         Date of Surgery:22              Date of JET:22    Allergies: Patient has no known allergies.     Ht Readings from Last 1 Encounters:   22 5' 10\" (1.778 m)      Wt Readings from Last 1 Encounters:   22 129 lb (58.5 kg)       TOTAL HIP REPLACEMENT PHYSICIANS ORDERS   I hereby authorize the pharmacy, under the formulary system to dispense a different brand of drug identical composition and comparable quality unless the brand name I have prescribed is circled on this order sheet  All orders without checkboxes will be processed automatically unless crossed out. Orders with checkboxes MUST be checked in order to be carried out. PRE-OP [email or web address]  [ ] X-ray operative site-standing view  Jose Manuel.Dears ] CBC without differential [X] Albumin  Jose Manuel.Dears ] BMP      [ ] Coag profile  [X] PT/INR   [ ] Sed rate on revisions of total joints only Jose Manuel.Dears ] EKG   Jose Manuel.Dears ] Type and screen Jose Manuel.Dears ] UAR       [X] A1C  Jose Manuel.Dears ] Clean catch urine for routine analysis, if positive for nitrites and/or leukocytes, do urine for C & S  Jose Manuel.Dears ] Nasal culture for MRSA  Jose Manuel.Dears ] Physical therapy evaluation and teaching  Jose Manuel.Dears ] Occupational therapy evaluation and teaching  Jose Manuel.Dears ] Inform patient to stop all anti-inflammatory medications including aspirin for 7 days before surgery    DAY OF SURGERY  Jose Manuel.Dears ] Cefazolin 2 gram IVPB; if patient weighs > 80 kg and serum creatinine <2.5 mg/dl give 2 gram dose within 1 hour of incision. If patient has a minor allergy to Penicillin, still give this. OR  If the pre-op nasal culture for MRSA was positive, repeat nasal swab before surgery and give: Vancomycin 1 gram IVPB, reduce dose of Vancomycin to 500 mg IVPB if PT < 55 kg or serum creatinine > 2 mg/dl (Vancomycin must be administered over 1 hour)& Cefazolin 1 gram IVPB; if patient weighs>80 kg and serum creatinine <2.3 mg/dl give 2 gram dose within 1 hour of incision  OR  If patient has a true severe allergy and underwent allergy testing to Penicillin or Cephalosporins give: Clindamycin 900mg IVPB, then administer 2 more doses post op.                Other order: Mobic 15mg pre-op       Jose Manuel.Dears ] Type and screen    Other order: APPLY KNEE HIGH ANTI-EMBOLIC AND PNEUMO-BOOTS TO UNOPERATIVE LEG    Other order: Incentive spirometry nursing: initiate in preoperative area to obtain patient baseline    T.O: _____________________________/___________________Date:_______Time:_______   Physician    RN    Physician Signature:               Date/Time:  4/6/2022 2:43 PM

## 2022-04-07 NOTE — FLOWSHEET NOTE
DOS: 4/25/22  Mariel Rodriguez    JET class 4/11/22 Please check lab work to make sure no criticals    H&P: To be done by Dr. Carmencita Connor Dr. On 4/19/22. Please check to make sure he is medically cleared. His phone number is [phone]. UPDATE: 4/22/22 JET class complete. No critical labs. EKG done. Saw Dr. Judi Saunders for surgery.

## 2022-04-07 NOTE — FLOWSHEET NOTE
Preoperative Screening for Elective Surgery/Invasive Procedures While COVID-19 present in the community     1. Have you tested positive or have been told to self-isolate for COVID-19 like symptoms within the past 28 days? 2. Do you currently have any of the following symptoms? ? Fever >100.0 F or 99.9 F in immunocompromised patients? ? New onset cough, shortness of breath or difficulty breathing? ? New onset sore throat, myalgia (muscle aches and pains), headache, loss of taste/smell or diarrhea? 3. Have you had a potential exposure to COVID-19 within the past 14 days by:  ? Close contact with a confirmed case? ? Close contact with a healthcare worker,  or essential infrastructure worker (grocery store, [employer], gas station, public utilities or transportation)? ? Do you reside in a congregate setting such as; skilled nursing facility, adult home, correctional facility, homeless shelter or other institutional setting? ? Have you had recent travel to a known COVID-19 hotspot? NO TO ALL ABOVE       * Admitted with diarrhea? [] YES    [x]  NO     *Prior history of C-Diff. In last 3 months? [] YES    [x]  NO     *Antibiotic use in the past 6-8 weeks? [x]  NO    []  YES      If yes, which: REASON_________________     *Prior hospitalization or nursing home in the last month? []  YES    [x]  NO     SAFETY FIRST. .call before you fall    [facility] [facility] time___4/25/22 0600_________        Surgery time_0730___________    Do not eat or drink anything after 12:00 midnight prior to your surgery. This includes water chewing gum, mints and ice chips- the Day of Surgery. You may brush your teeth and gargle the morning of your surgery, but do not swallow the water     Please see your family doctor/pediatrician for a history and physical and/or questions concerning medications.    Bring any test results/reports from your physicians office. If you are under the care of a heart doctor or specialist doctor, please be aware that you may be asked to them for clearance    You may be asked to stop blood thinners such as Coumadin, Plavix, Fragmin, Lovenox, etc., or any anti-inflammatories such as:  Aspirin, Ibuprofen, Advil, Naproxen prior to your surgery. We also ask that you stop any OTC medications such as fish oil, vitamin E, glucosamine, garlic, Multivitamins, COQ 10, etc.    We ask that you do not smoke 24 hours prior to surgery  We ask that you do not  drink any alcoholic beverages 24 hours prior to surgery     You must make arrangements for a responsible adult to take you home after your surgery. For your safety you will not be allowed to leave alone or drive yourself home. Your surgery will be cancelled if you do not have a ride home. Also for your safety, it is strongly suggested that someone stay with you the first 24 hours after your surgery. A parent or legal guardian must accompany a child scheduled for surgery and plan to stay at the hospital until the child is discharged. Please do not bring other children with you. For your comfort, please wear simple loose fitting clothing to the hospital.  Please do not bring valuables. Do not wear any make-up or nail polish on your fingers or toes. For your safety, please do not wear any jewelry or body piercing's on the day of surgery. All jewelry must be removed. If you have dentures, they will be removed before going to operating room. For your convenience, we will provide you with a container. If you wear contact lenses or glasses, they will be removed, please bring a case for them. If you have a living will and a durable power of  for healthcare, please bring in a copy.      As part of our patient safety program to minimize surgical site infections, we ask you to do the following:    · Please notify your surgeon if you develop any illness between now and the day of your surgery. · This includes a cough, cold, fever, sore throat, nausea,         or vomiting, and diarrhea, etc.  ·  Please notify your surgeon if you experience dizziness, shortness         of breath or blurred vision between now and the time of your surgery. Do not shave your operative site 96 hours prior to surgery. For face and neck surgery, men may use an electric razor 48 hours   prior to surgery. You may shower the night before surgery or the morning of   your surgery with an antibacterial soap. You will need to bring a photo ID and insurance card     If you use a C-pap or Bi-pap machine, please bring your machine with you to the hospital     Our goal is to provide you with excellent care, therefore, visitors will be limited to so that we may focus on providing this care for you. Please contact your surgeon office, if you have any further questions. [facility] phone number:  [phone] PAT fax number:  [phone]    Please note these are generalized instructions for all surgical cases, you may be provided with more specific instructions according to your surgery.

## 2022-04-11 ENCOUNTER — HOSPITAL ENCOUNTER (OUTPATIENT)
Dept: PREADMISSION TESTING | Age: 67
Discharge: HOME | End: 2022-04-15
Payer: MEDICARE

## 2022-04-11 ENCOUNTER — TELEPHONE (OUTPATIENT)
Dept: ORTHOPEDIC SURGERY | Age: 67
End: 2022-04-11

## 2022-04-11 DIAGNOSIS — Z01.818 PREOP TESTING: ICD-10-CM

## 2022-04-11 LAB
ABO/RH: NORMAL
ALBUMIN SERPL-MCNC: 4.5 G/DL (ref 3.4–5)
ANION GAP SERPL CALCULATED.3IONS-SCNC: 17 MMOL/L (ref 3–16)
ANTIBODY SCREEN: NORMAL
BASOPHILS ABSOLUTE: 0 K/UL (ref 0–0.2)
BASOPHILS RELATIVE PERCENT: 0.2 %
BILIRUBIN URINE: NEGATIVE
BLOOD, URINE: NEGATIVE
BUN BLDV-MCNC: 21 MG/DL (ref 7–20)
CALCIUM SERPL-MCNC: 9.9 MG/DL (ref 8.3–10.6)
CHLORIDE BLD-SCNC: 100 MMOL/L (ref 99–110)
CLARITY: CLEAR
CO2: 25 MMOL/L (ref 21–32)
COLOR: YELLOW
CREAT SERPL-MCNC: 1 MG/DL (ref 0.8–1.3)
EKG ATRIAL RATE: 55 BPM
EKG DIAGNOSIS: NORMAL
EKG P AXIS: 55 DEGREES
EKG P-R INTERVAL: 144 MS
EKG Q-T INTERVAL: 416 MS
EKG QRS DURATION: 86 MS
EKG QTC CALCULATION (BAZETT): 397 MS
EKG R AXIS: 66 DEGREES
EKG T AXIS: 63 DEGREES
EKG VENTRICULAR RATE: 55 BPM
EOSINOPHILS ABSOLUTE: 0.1 K/UL (ref 0–0.6)
EOSINOPHILS RELATIVE PERCENT: 0.7 %
GFR AFRICAN AMERICAN: >60
GFR NON-AFRICAN AMERICAN: >60
GLUCOSE BLD-MCNC: 106 MG/DL (ref 70–99)
GLUCOSE URINE: NEGATIVE MG/DL
HCT VFR BLD CALC: 44.6 % (ref 40.5–52.5)
HEMOGLOBIN: 15.2 G/DL (ref 13.5–17.5)
INR BLD: 0.91 (ref 0.88–1.12)
KETONES, URINE: NEGATIVE MG/DL
LEUKOCYTE ESTERASE, URINE: NEGATIVE
LYMPHOCYTES ABSOLUTE: 1.1 K/UL (ref 1–5.1)
LYMPHOCYTES RELATIVE PERCENT: 12 %
MCH RBC QN AUTO: 34.3 PG (ref 26–34)
MCHC RBC AUTO-ENTMCNC: 34.1 G/DL (ref 31–36)
MCV RBC AUTO: 100.7 FL (ref 80–100)
MICROSCOPIC EXAMINATION: NORMAL
MONOCYTES ABSOLUTE: 0.6 K/UL (ref 0–1.3)
MONOCYTES RELATIVE PERCENT: 6.2 %
NEUTROPHILS ABSOLUTE: 7.4 K/UL (ref 1.7–7.7)
NEUTROPHILS RELATIVE PERCENT: 80.9 %
NITRITE, URINE: NEGATIVE
PDW BLD-RTO: 11.9 % (ref 12.4–15.4)
PH UA: 6 (ref 5–8)
PLATELET # BLD: 259 K/UL (ref 135–450)
PMV BLD AUTO: 8 FL (ref 5–10.5)
POTASSIUM SERPL-SCNC: 4.8 MMOL/L (ref 3.5–5.1)
PROTEIN UA: NEGATIVE MG/DL
PROTHROMBIN TIME: 10.2 SEC (ref 9.9–12.7)
RBC # BLD: 4.43 M/UL (ref 4.2–5.9)
SODIUM BLD-SCNC: 142 MMOL/L (ref 136–145)
SPECIFIC GRAVITY UA: 1.01 (ref 1–1.03)
URINE REFLEX TO CULTURE: NORMAL
URINE TYPE: NORMAL
UROBILINOGEN, URINE: 0.2 E.U./DL
WBC # BLD: 9.2 K/UL (ref 4–11)

## 2022-04-11 PROCEDURE — 83036 HEMOGLOBIN GLYCOSYLATED A1C: CPT

## 2022-04-11 PROCEDURE — 82040 ASSAY OF SERUM ALBUMIN: CPT

## 2022-04-11 PROCEDURE — 93010 ELECTROCARDIOGRAM REPORT: CPT | Performed by: INTERNAL MEDICINE

## 2022-04-11 PROCEDURE — 86900 BLOOD TYPING SEROLOGIC ABO: CPT

## 2022-04-11 PROCEDURE — 85025 COMPLETE CBC W/AUTO DIFF WBC: CPT

## 2022-04-11 PROCEDURE — 85610 PROTHROMBIN TIME: CPT

## 2022-04-11 PROCEDURE — 86901 BLOOD TYPING SEROLOGIC RH(D): CPT

## 2022-04-11 PROCEDURE — 81003 URINALYSIS AUTO W/O SCOPE: CPT

## 2022-04-11 PROCEDURE — 87641 MR-STAPH DNA AMP PROBE: CPT

## 2022-04-11 PROCEDURE — 93005 ELECTROCARDIOGRAM TRACING: CPT

## 2022-04-11 PROCEDURE — 86850 RBC ANTIBODY SCREEN: CPT

## 2022-04-11 PROCEDURE — 80048 BASIC METABOLIC PNL TOTAL CA: CPT

## 2022-04-11 NOTE — TELEPHONE ENCOUNTER
General Question     Subject: WIFE/DAI IS REQUESTING A CALL BACK FROM [facility]. PT IS IN SO MUCH PAIN HE NEEDS TO CX HIS JET CLASS.     Patient and /or Facility RequestDinaethai FelixIker  Contact Number: [phone]

## 2022-04-11 NOTE — PROGRESS NOTES
Patient and wife Abby Saravia attended JET class on 4/11/22. Patient verified surgery for Total hip replacement. Patient and Abby Saravia received patient information and educational JET folder including the following handouts: jet powerpoint, covid-19 restrictions, ERAS, incentive spirometry including purpose and how to perform, case management contact information, hand hygiene, preventing constipation, home health care agency list, skilled nursing facility list, pre-operative showering techniques and the use of anti-septic 3 days before surgery. Interviews completed by PT, OT, and PAT. Labs and Tests completed as ordered/necessary. Anti-septic bottle given to patient to take home. Patient and Abby Saravia states no further questions or concerns. Patient provided orthopedic office and nurse navigator contact information. DOS: 4/25/22  Dr Torri Santoro: home with wife liliana and [facility] ;if applicable. Braeden PARKER needs:needs rolling walker, agreeable to [employer]. Per Patient Will see/Saw PCP on 4/19/22.      Electronically signed by Ana Maria Ho RN on 4/11/2022 at 5:29 PM

## 2022-04-12 ENCOUNTER — TELEPHONE (OUTPATIENT)
Dept: ORTHOPEDICS UNIT | Age: 67
End: 2022-04-12

## 2022-04-12 LAB
ESTIMATED AVERAGE GLUCOSE: 108.3 MG/DL
HBA1C MFR BLD: 5.4 %
MRSA SCREEN RT-PCR: NORMAL

## 2022-04-12 NOTE — TELEPHONE ENCOUNTER
Attempted to contact patient. Left hippa compliant voicemail for patient stating call back number.    Almas Gusman  Orthopedic Nurse Navigator  Phone number: [phone]  Future Appointments   Date Time Provider Shelly Sethi   4/13/2022  1:45 PM Rosalinda Garcia DO [facility] [place] [facility]   4/15/2022 11:00 AM Suraj Damon, PT [facility] PT [facility]   4/19/2022 11:00 AM Rosalinda Garcia DO [facility] [place] [facility]   4/25/2022  7:35 AM Hernesto Brandon MD [facility]   5/10/2022 10:00 AM Hernesto Brandon MD [facility]   6/14/2022  9:30 AM Rosalinda Garcia DO [facility] [place] [facility]       Electronically signed by Shoshana Aguilar RN on 4/12/2022 at 1:21 PM

## 2022-04-13 ENCOUNTER — TELEMEDICINE (OUTPATIENT)
Dept: FAMILY MEDICINE CLINIC | Age: 67
End: 2022-04-13
Payer: MEDICARE

## 2022-04-13 DIAGNOSIS — M15.9 PRIMARY OSTEOARTHRITIS INVOLVING MULTIPLE JOINTS: Primary | ICD-10-CM

## 2022-04-13 DIAGNOSIS — Z00.00 INITIAL MEDICARE ANNUAL WELLNESS VISIT: ICD-10-CM

## 2022-04-13 PROCEDURE — G0438 PPPS, INITIAL VISIT: HCPCS | Performed by: FAMILY MEDICINE

## 2022-04-13 RX ORDER — HYDROCODONE BITARTRATE AND ACETAMINOPHEN 5; 325 MG/1; MG/1
1 TABLET ORAL EVERY 6 HOURS PRN
Qty: 48 TABLET | Refills: 0 | Status: ON HOLD | OUTPATIENT
Start: 2022-04-13 | End: 2022-04-25 | Stop reason: HOSPADM

## 2022-04-13 ASSESSMENT — PATIENT HEALTH QUESTIONNAIRE - PHQ9
SUM OF ALL RESPONSES TO PHQ9 QUESTIONS 1 & 2: 1
SUM OF ALL RESPONSES TO PHQ QUESTIONS 1-9: 1
SUM OF ALL RESPONSES TO PHQ QUESTIONS 1-9: 1
2. FEELING DOWN, DEPRESSED OR HOPELESS: 1
1. LITTLE INTEREST OR PLEASURE IN DOING THINGS: 0
SUM OF ALL RESPONSES TO PHQ QUESTIONS 1-9: 1
SUM OF ALL RESPONSES TO PHQ QUESTIONS 1-9: 1

## 2022-04-13 ASSESSMENT — LIFESTYLE VARIABLES
HOW MANY STANDARD DRINKS CONTAINING ALCOHOL DO YOU HAVE ON A TYPICAL DAY: 1 OR 2
HOW OFTEN DO YOU HAVE A DRINK CONTAINING ALCOHOL: 2-3 TIMES A WEEK

## 2022-04-13 NOTE — PROGRESS NOTES
Luke Hernandez is a 73 y.o. male evaluated via telephone on 2022 for No chief complaint on file. .        Documentation:  I communicated with the patient and/or health care decision maker about (see below). Details of this discussion including any medical advice provided: (see below)    Total Time: minutes: 11-20 minutes    Luke Hernandez was evaluated through a synchronous (real-time) audio encounter. Patient identification was verified at the start of the visit. He (or guardian if applicable) is aware that this is a billable service, which includes applicable co-pays. This visit was conducted with the patient's (and/or legal guardian's) verbal consent. He has not had a related appointment within my department in the past 7 days or scheduled within the next 24 hours. The patient was located in a state where the provider was licensed to provide care. Note: not billable if this call serves to triage the patient into an appointment for the relevant concern    Chase Marks DO        Medicare Annual Wellness Visit  Name: Maria Eugenia Tanner Date: 2022   MRN: [id number] Sex: Male   Age: 73 y.o. Ethnicity: Non- / Non    : 1955 Race: White (non-)      Luke Hernandez is here for No chief complaint on file. Screenings for behavioral, psychosocial and functional/safety risks, and cognitive dysfunction are all negative except as indicated below. These results, as well as otherpatient data from the Health Risk Assessment form, are documented in Flowsheets linked to this Encounter. No Known Allergies  Prior to Visit Medications    Medication Sig Taking? Authorizing Provider   HYDROcodone-acetaminophen (NORCO) 5-325 MG per tablet Take 1 tablet by mouth every 6 hours as needed for Pain for up to 12 days. Intended supply: 7 days.  Take lowest dose possible to manage pain Yes Dinorah Up DO   ibuprofen (ADVIL;MOTRIN) 800 MG tablet Take 1 tablet by mouth 3 times daily (with meals)  Amber Brush DO   amLODIPine-benazepril (LOTREL) 5-20 MG per capsule TAKE 1 CAPSULE BY MOUTH EVERY DAY  Corbin Up DO   atenolol (TENORMIN) 50 MG tablet TAKE 1 TABLET BY MOUTH EVERY DAY  Corbin Up DO   rasagiline mesylate 1 MG TABS Take 0.5 tablets by mouth daily   Historical Provider, MD   carbidopa-levodopa (SINEMET)  MG per tablet Take 1 tablet by mouth 4 times daily  Historical Provider, MD   aspirin 81 MG tablet Take 81 mg by mouth daily  Historical Provider, MD   Multiple Vitamins-Minerals (MULTIVITAMIN ADULT PO) Take 1 tablet by mouth daily  Historical Provider, MD   Calcium Carbonate-Vitamin D (CALCIUM + D PO) Take 1 tablet by mouth daily  Historical Provider, MD   B Complex Vitamins (VITAMIN B-COMPLEX PO) Take 1 tablet by mouth daily  Historical Provider, MD     Past Medical History:   Diagnosis Date    Anxiety     Cancer ([facility] [address] 04/2022    skin-on face    Chronic right shoulder pain     Colon polyps 04/28/2017    Dharmesh Quiñones MD    Diverticulosis of colon 04/28/2017    Colonoscopy    Epididymitis     Essential hypertension     Hip pain 04/2020    right-walks with wALKER    Hyperglycemia     Idiopathic Parkinson's disease ([facility] [address] [facility]     Dr. Valery Kimball Internal hemorrhoids 04/28/2017    Colonoscopy    Major depression single episode, in partial remission ([facility] [address] [facility]     Primary osteoarthritis involving multiple joints     S/P Left Hip Replacement     Past Surgical History:   Procedure Laterality Date    HERNIA REPAIR Right     HERNIA REPAIR Bilateral     TOOTH EXTRACTION      TOTAL HIP ARTHROPLASTY Left 3/15/16    Dr. Nanette Jett EXTRACTION       Family History   Problem Relation Age of Onset    Heart Disease Father         MI   [employer] Arthritis Mother     High Blood Pressure Mother     High Blood Pressure Sister     Diabetes Maternal Grandfather     Heart Disease Paternal Grandfather         MI       CareTeam (Including outside providers/suppliers regularly involved in providing care):   Patient Care Team:  Sherry Martinez DO as PCP - General (Family Medicine)  Sherry Martinez DO as PCP - [facility]  Mary Beth Zamarripa MD as Surgeon (Orthopedic Surgery)  Charlie Collins RN as Nurse Navigator (Orthopedic Surgery)  Gunner Wells DO as Consulting Physician (Neurology)    Wt Readings from Last 3 Encounters:   04/06/22 129 lb (58.5 kg)   04/04/22 130 lb 12.8 oz (59.3 kg)   12/14/21 131 lb (59.4 kg)     There were no vitals filed for this visit. Physical Exam    Patient's complete Health Risk Assessment andscreening values have been reviewed and are found in Flowsheets. The following problems were reviewed today and where indicated follow up appointments were madeand/or referrals ordered.     Positive Risk Factor Screeningswith Interventions:          General Health and ACP:  General  In general, how would you say your health is?: Good  In the past 7 days, have you experienced any of the following: New or Increased Pain, New or Increased Fatigue, Loneliness, Social Isolation, Stress or Anger?: No  Do you get the social and emotional support that you need?: Yes  Do you have a Living Will?: Yes    Advance Directives     Power of  Living Will ACP-Advance Directive ACP-Power of     Not on File Not on File Not on File Not on File      General Health Risk Interventions:  · no needs       Safety:  Do you have working smoke detectors?: Yes  Do you have any tripping hazards - loose or unsecured carpets or rugs?: (!) Yes (I recommended that they secure rugs to the floor.)  Do you have any tripping hazards - clutter in doorways, halls, or stairs?: No  Do you have either shower bars, grab bars, non-slip mats or non-slip surfaces in your shower or bathtub?: Yes  Do all of your stairways have a railing or banister?: Yes  Do you always fasten your seatbelt when you are in a car?: Yes    Safety Interventions:  · Home safety tips provided     Personalized Preventive Plan   Current Health Maintenance Status  Immunization History   Administered Date(s) Administered    COVID-19, Arco, Primary or Immunocompromised, PF, 100mcg/0.5mL 03/18/2021, 04/15/2021    Pneumococcal Polysaccharide ([id number]) 08/24/2021    Tdap (Boostrix, Adacel) 05/21/2014    Zoster Recombinant (Shingrix) 03/27/2018, 09/10/2018        Health Maintenance   Topic Date Due    Annual Wellness Visit (AWV)  Never done    COVID-19 Vaccine (3 - Booster for Arco series) 03/03/2023 (Originally 9/15/2021)    Pneumococcal 65+ years Vaccine (2 - PCV) 08/24/2022    Flu vaccine (Season Ended) 09/01/2022    Depression Monitoring  04/04/2023    Potassium monitoring  04/11/2023    Creatinine monitoring  04/11/2023    DTaP/Tdap/Td vaccine (2 - Td or Tdap) 05/21/2024    Lipid screen  06/15/2026    Colorectal Cancer Screen  04/28/2027    Shingles Vaccine  Completed    AAA screen  Completed    Hepatitis C screen  Completed    HIV screen  Completed    Hepatitis A vaccine  Aged Out    Hepatitis B vaccine  Aged Out    Hib vaccine  Aged Out    Meningococcal (ACWY) vaccine  Aged Out     Recommendations forPreventive Services Due: see orders.   Recommended screening schedule for the next 5-10 years is provided to the patient inwritten form: see Patient Instructions/AVS.  RTO 3 months for Hypertension

## 2022-04-13 NOTE — PATIENT INSTRUCTIONS
Personalized Preventive Plan for Benigno Galan - 4/13/2022  Medicare offers a range of preventive health benefits. Some of the tests and screenings are paid in full while other may be subject to a deductible, co-insurance, and/or copay. Some of these benefits include a comprehensive review of your medical history including lifestyle, illnesses that may run in your family, and various assessments and screenings as appropriate. After reviewing your medical record and screening and assessments performed today your provider may have ordered immunizations, labs, imaging, and/or referrals for you. A list of these orders (if applicable) as well as your Preventive Care list are included within your After Visit Summary for your review. Other Preventive Recommendations:    · A preventive eye exam performed by an eye specialist is recommended every 1-2 years to screen for glaucoma; cataracts, macular degeneration, and other eye disorders. · A preventive dental visit is recommended every 6 months. · Try to get at least 150 minutes of exercise per week or 10,000 steps per day on a pedometer . · Order or download the FREE \"Exercise & Physical Activity: Your Everyday Guide\" from [employer]. Call 5-774.571.9324 or search [employer] online. · You need 3555-5350 mg of calcium and 1668-6578 IU of vitamin D per day. It is possible to meet your calcium requirement with diet alone, but a vitamin D supplement is usually necessary to meet this goal.  · When exposed to the sun, use a sunscreen that protects against both UVA and UVB radiation with an SPF of 30 or greater. Reapply every 2 to 3 hours or after sweating, drying off with a towel, or swimming. · Always wear a seat belt when traveling in a car. Always wear a helmet when riding a bicycle or motorcycle.

## 2022-04-14 ENCOUNTER — TELEPHONE (OUTPATIENT)
Dept: ORTHOPEDIC SURGERY | Age: 67
End: 2022-04-14

## 2022-04-14 NOTE — PLAN OF CARE
[address] [facility] [place] [address]  Phone: [phone]   Fax:     [phone]          Physical Therapy Certification    Dear Referring Practitioner: Dr Zaire Burns,    We had the pleasure of evaluating the following patient for physical therapy services at [facility]. A summary of our findings can be found in the initial assessment below. This includes our plan of care. If you have any questions or concerns regarding these findings, please do not hesitate to contact me at the office phone number checked above.   Thank you for the referral.       Physician Signature:_______________________________Date:__________________  By signing above (or electronic signature), therapists plan is approved by physician        Patient: Benigno Galan   : 1955   MRN: [id number]  Referring Physician: Referring Practitioner: Dr Zaire Burns      Evaluation Date: 4/15/2022      Medical Diagnosis Information:  Diagnosis: Primary osteoarthritis of right hip (M16.11   Treatment Diagnosis: Gait and mobility dysfunction consistent with hip OA                                         Insurance information: PT Insurance Information: BCBS MEDICARE     Precautions/ Contra-indications:   Latex Allergy:  [x]NO      []YES  Preferred Language for Healthcare:   [x]English       []other:    C-SSRS Triggered by Intake questionnaire (Past 2 wk assessment ):   [x] No, Questionnaire did not trigger screening.   [] Yes, Patient intake triggered C-SSRS Screening      [] C-SSRS Screening completed  [] PCP notified via Epic     SUBJECTIVE: Patient stated complaint:c/o right pain for a year pain exacerbated from a slip in his yard (denies fall), he is retired ,likes yard work, housework, cooking    Relevant Medical History:   Functional Outcome Measure: LEFS=  21    Pain Scale: 3-9/10  Easing factors: nothing  Provocative factors: sleeping positions, movment    Type: [x]Constant   []Intermittent  []Radiating []Localized []other:     Numbness/Tingling: none    Occupation/School:retired    Hospital:    [] Total Hip Replacement [x] Right  [] Left  DOS: 4/25/22   [] Not yet scheduled  [] Total Knee Replacement [] Right  [] Left    [x] Prehab Eval  [] Prehab D/C  [] Post - OP Visit             Weeks from sx [] Post-op D/C    DME ASSESSMENT:   Current available equipment:    [] Std. Anne Favia       [] Rolling walker      [x] 4 wheeled walker     [] Lavonne Erp     [x] Straight cane     [] Crutches   [x] Reacher            [] Sock Aid              [x] Shower chair            [x] Leg           [x] Long handle shoe horn   [] Other:     Equipment needed at discharge from hospital:   [] Std. Anne Favia       [] Rolling walker      [] 4 wheeled walker     [] Lavonne Erp     [] Straight cane     [] Crutches   [] Reacher            [x] Sock Aid              [] Shower chair            [] Leg           [] Long handle shoe horn   [] Other:       SOCIAL ASSESSMENT:  1. Will you live with someone who can care for you after surgery? [x] Yes  [] No  2. Where is the bathroom located in your post-surgery place of residence? [x] 1st Floor [] 2nd Floor  3. Where is the bedroom located in your post-surgery place of residence? [x] 1st Floor [] 2nd Floor  4. Do you use community supports (home help, meals-on-wheels, district nurse)? [x] None or 1 per week  [] 2 or more per week  5. How many stairs will you have to climb to get in to your place of residence? [x] Less than 5  [] More than 5  6. Have you had a fall in the past year? [] Yes  [x] No     Notes:     Available PT Visits:      BMI:    Height 5\" 10\"   Weight 130#     FUNCTIONAL ASSESSMENT:   1. Do you use ambulatory aids? [] None [] Single Point Cane  [x] / 4 wheel Walker/ir  2. How far on average can you walk?    [x] 2 Blocks or more  [] 1-2 Blocks  [] House bound most of the time  3. What is your physical activity level?     [] Highly Active [x] Active  [] Somewhat active  [] Sedentary     OBJECTIVE:   Palpation: ttp right hip muscles    Quad: good quad tone bilateral     Functional Mobility/Transfers:     Posture:     Bandages/Dressings/Incisions: n/a    Gait: walks with a 4 wheel walker     Dermatomes Normal Abnormal Comments   inguinal area (L1)       anterior mid-thigh (L2)      distal ant thigh/med knee (L3)      medial lower leg and foot (L4)      lateral lower leg and foot (L5)      posterior calf (S1)      medial calcaneus (S2)          Myotomes Normal Abnormal Comments   Hip flexion (L1-L2)      Knee extension (L2-L4)      Dorsiflexion (L4-L5)      Great Toe Ext (L5)      Ankle Eversion (S1-S2)      Ankle PF(S1-S2)          Reflexes Normal Abnormal Comments   S1-2 Seated achilles      S1-2 Prone knee bend      L3-4 Patellar tendon      Clonus      Babinski           PROM AROM    L R L R   Hip Flexion       Knee Flexion       Knee Extension           Strength (0-5) Left Right   Hip Flexion - supine     Hip Flexion - seated     Hip Abduction - sidelyling # #   Hip Adduction     Hip Extension     Quads # #   Hamstrings          Flexibility     Hamstrings (90/90)     ITB (Diana)     Quads (Ely's)     Hip Flexor Mickeal Cancel)          Girth     Mid patella     Suprapatellar         Joint mobility: patellofemoral right hip hypomobility   []Normal    [x]Hypo   []Hyper         Functional Testing  Sx Date  Prehab Date 4/15/22 Post-op Re-Eval Date  4 week F/U date  8 week F/U date  D/C Date       TUG (sec) 22       30 second sit to stand (reps) 6       6 minute walk (m) n/a          Balance:    Narrowed DIAZ (sec) 10       Semi Tandem (sec) 10               Tandem (sec)   10               RLE SLS (sec) 8            Knee AROM L R L R L R L R L R   Flexion 135 120           Extension 0 0              Knee Ext: L R L R L R L R L R   MMT (of 5)             Knee Ext (#) 46 18 Hip Abd:  L R L R L R L R L R   MMT (of 5)             Hip Abd (#) 5 24              LEFS (raw)                               [x] Patient history, allergies, meds reviewed. Medical chart reviewed. See intake form. Review Of Systems (ROS):  [x]Performed Review of systems (Integumentary, CardioPulmonary, Neurological) by intake and observation. Intake form has been scanned into medical record. Patient has been instructed to contact their primary care physician regarding ROS issues if not already being addressed at this time.       Co-morbidities/Complexities (which will affect course of rehabilitation):   []None           Arthritic conditions   []Rheumatoid arthritis (M05.9)  [x]Osteoarthritis (M19.91)   Cardiovascular conditions   [x]Hypertension (I10)  []Hyperlipidemia (E78.5)  []Angina pectoris (I20)  []Atherosclerosis (I70)   Musculoskeletal conditions   []Disc pathology   []Congenital spine pathologies   []Prior surgical intervention  []Osteoporosis (M81.8)  []Osteopenia (M85.8)   Endocrine conditions   []Hypothyroid (E03.9)  []Hyperthyroid Gastrointestinal conditions   []Constipation (I47.64)   Metabolic conditions   []Morbid obesity (E66.01)  []Diabetes type 1(E10.65) or 2 (E11.65)   []Neuropathy (G60.9)     Pulmonary conditions   []Asthma (J45)  []Coughing   []COPD (J44.9)   Psychological Disorders  [x]Anxiety (F41.9)  [x]Depression (F32.9)   []Other:   [x]Other:     L CHALO 2016  Parkinson's  Skin CA (face)  Hernia repairs  Chronic right shoulder pain        Barriers to/and or personal factors that will affect rehab potential:              [x]Age  []Sex    []Smoker              []Motivation/Lack of Motivation                        [x]Co-Morbidities              []Cognitive Function, education/learning barriers              []Environmental, home barriers              []profession/work barriers  []past PT/medical experience  []other:  Justification:     Falls Risk Assessment (30 days):   [x] Falls Risk assessed and no intervention required. [] Falls Risk assessed and Patient requires intervention due to being higher risk   TUG score (>12s at risk):     [] Falls education provided, including         ASSESSMENT: Skilled PT services are required to address the following impairments   Functional Impairments:     [x]Noted lumbar/proximal hip/LE joint hypomobility   []Decreased LE functional ROM   [x]Decreased core/proximal hip strength and neuromuscular control   [x]Decreased LE functional strength   []Reduced balance/proprioceptive control   []other:      Functional Activity Limitations (from functional questionnaire and intake)   [x]Reduced ability to tolerate prolonged functional positions   [x]Reduced ability or difficulty with changes of positions or transfers between positions   []Reduced ability to maintain good posture and demonstrate good body mechanics with sitting, bending, and lifting   [x]Reduced ability to sleep   [x] Reduced ability or tolerance with driving and/or computer work   [x]Reduced ability to perform lifting, carrying tasks   [x]Reduced ability to squat   [x]Reduced ability to forward bend   [x]Reduced ability to ambulate prolonged functional periods/distances/surfaces   [x]Reduced ability to ascend/descend stairs   []Reduced ability to run, hop, cut or jump   []other:    Participation Restrictions   [x]Reduced participation in self care activities   [x]Reduced participation in home management activities   [x]Reduced participation in work activities   []Reduced participation in social activities. []Reduced participation in sport/recreation activities. Classification :    []Signs/symptoms consistent with post-surgical status including decreased ROM, strength and function.    []Signs/symptoms consistent with joint sprain/strain   []Signs/symptoms consistent with patella-femoral syndrome   [x]Signs/symptoms consistent with knee OA/hip OA   []Signs/symptoms consistent with internal derangement of knee/Hip   []Signs/symptoms consistent with functional hip weakness/NMR control      []Signs/symptoms consistent with tendinitis/tendinosis    []signs/symptoms consistent with pathology which may benefit from Dry needling      []other:      Prognosis/Rehab Potential:      []Excellent   [x]Good    [x]Fair   []Poor    Tolerance of evaluation/treatment:    []Excellent   []Good    [x]Fair   []Poor    Physical Therapy Evaluation Complexity Justification  [x] A history of present problem with:  [] no personal factors and/or comorbidities that impact the plan of care;  []1-2 personal factors and/or comorbidities that impact the plan of care  [x]3 personal factors and/or comorbidities that impact the plan of care  [x] An examination of body systems using standardized tests and measures addressing any of the following: body structures and functions (impairments), activity limitations, and/or participation restrictions;:  [] a total of 1-2 or more elements   [x] a total of 3 or more elements   [] a total of 4 or more elements   [x] A clinical presentation with:  [x] stable and/or uncomplicated characteristics   [] evolving clinical presentation with changing characteristics  [] unstable and unpredictable characteristics;   [x] Clinical decision making of [x] low , [] moderate, [] high complexity using standardized patient assessment instrument and/or measurable assessment of functional outcome.     [] EVAL (LOW) 93430 (typically 30 minutes face-to-face)  [] EVAL (MOD) 16673 (typically 30 minutes face-to-face)  [] EVAL (HIGH) 39435 (typically 45 minutes face-to-face)  [] RE-EVAL     PLAN:   Frequency/Duration:  1 day per week for 1 Weeks:  Interventions:  [x]  Therapeutic exercise including: strength training, ROM, for Lower extremity and core   [x]  NMR activation and proprioception for LE, Glutes and Core   [x]  Manual therapy as indicated for LE, Hip and spine to include: Dry Needling/IASTM, STM, PROM, Gr I-IV mobilizations, manipulation. [x] Modalities as needed that may include: thermal agents, E-stim, Biofeedback, US, iontophoresis as indicated  [x] Patient education on joint protection, postural re-education, activity modification, progression of HEP. HEP instruction:  Access Code: [email or web address]: [email or web address]/[email or web address]: 04/15/2022Prepared by: Britney HobbsProgram Notes CHALO protocol posterior precautions  Exercises   Supine Gluteal Sets - 2 x daily - 7 x weekly - 1 sets - 10 reps - 5 hold   Supine Quad Set - 2 x daily - 7 x weekly - 1 sets - 10 reps - 5 hold   Supine Ankle Pumps - 2 x daily - 7 x weekly - 1 sets - 10 reps   Supine Heel Slide - 2 x daily - 7 x weekly - 1 sets - 10 reps   Supine Hip Abduction - 2 x daily - 7 x weekly - 1 sets - 10 reps   Seated Long Arc Quad - 2 x daily - 7 x weekly - 1 sets - 10 reps  Patient Education   CHALO Posterior Precautions Handout   Going Up and Down Stairs with Walker   Car Transfer with Walker and Surgery Precautions   Tub Transfer with Walker and Posterior Hip Precautions   Standing Tub Transfer with Hip Precautions     written HEP instructions provided and discussed    Patient education:    The patient was educated on and practiced gait with a rolling walker on a level surface. They demonstrated proper technique, good safety awareness and good posture following the instruction. Balance was observed to be good. All patient questions regarding gait were answered. They demonstrated proper technique and verbalized understanding to stair climbing instruction. All patient questions regarding stairs were answered. Patient was thoroughly educated on this date regarding prehabilitation goals, importance of PT sessions in improving overall ROM, strength and stability prior to surgery, and how prehabilitation will facilitate improved post-operative outcomes. The patient was educated on and instructed in HEP as listed.  The patient was given a detailed handout for exercises to initiate in the hospital post-operatively as well as at home. The discharge plan from the hospital was reviewed with the patient; specifically, to reduce length of hospital stay and to minimize time before reinitiating outpatient physical therapy after surgery. Education regarding early mobility post-operatively in the hospital and emphasis on working with both physical therapy and nursing staff to achieve ambulation goal of 150 feet was provided. The patient was highly encouraged to attend joint class in hospital prior to surgery for further instructions on pre and post-surgical care. Also, the patient was educated on precautions after hip replacement to avoid, specifically, hip extension and repetitive hip flexion. It is in my medical opinion that this patient is clear from all physical barriers prior to consideration for surgery, activity modifications prior to and post operatively have been discussed with this patient as well as discharge planning and is cleared for surgery from physical therapy perspective. GOALS:  Patient stated goal:get rid of right hip pain, return to prior level of function   [x] Progressing: [] Met: [] Not Met: [] Adjusted    Therapist goals for Patient:   Short Term Goals: To be achieved in: 1 visit  1. Independent in HEP and progression per patient tolerance, in order to prevent re-injury. [] Progressing: [x] Met: [] Not Met: [] Adjusted  2. All patient questions regarding expectations for rehab following upcoming surgery are answered.    [] Progressing: [x] Met: [] Not Met: [] Adjusted    Long Term Goals: long term goals to be determined at re-eval following upcoming surgery    Electronically signed by:  Ty Hawkins PT

## 2022-04-14 NOTE — TELEPHONE ENCOUNTER
CPT: 39164  BODY PART: right hip  STATUS: outpatient  AUTHORIZATION: approved    Submitted online with [facility]IM 4/14/22  Case is due to close on 4/22/22    Surgery approved # [id number]  4/25/22-6/23/22

## 2022-04-15 ENCOUNTER — HOSPITAL ENCOUNTER (OUTPATIENT)
Dept: PHYSICAL THERAPY | Age: 67
Setting detail: THERAPIES SERIES
Discharge: HOME | End: 2022-04-15
Payer: MEDICARE

## 2022-04-15 PROCEDURE — 97161 PT EVAL LOW COMPLEX 20 MIN: CPT

## 2022-04-15 PROCEDURE — 97530 THERAPEUTIC ACTIVITIES: CPT

## 2022-04-15 PROCEDURE — 97110 THERAPEUTIC EXERCISES: CPT

## 2022-04-18 NOTE — PROGRESS NOTES
Subjective:      Patient ID: Ardena Kocher is a 73 y.o. male. HPI  PREOPERATIVE PHYSICAL:    Patient was sent by Dr. Claudell Donath for preoperative clearance to have Right Total Hip Replacement surgery on 4-25-22 at [facility].  Patient had labs at [facility] class. No Known Allergies  Current Outpatient Medications   Medication Sig Dispense Refill    HYDROcodone-acetaminophen (NORCO) 5-325 MG per tablet Take 1 tablet by mouth every 6 hours as needed for Pain for up to 12 days. Intended supply: 7 days. Take lowest dose possible to manage pain 48 tablet 0    ibuprofen (ADVIL;MOTRIN) 800 MG tablet Take 1 tablet by mouth 3 times daily (with meals) 90 tablet 0    amLODIPine-benazepril (LOTREL) 5-20 MG per capsule TAKE 1 CAPSULE BY MOUTH EVERY DAY 90 capsule 0    atenolol (TENORMIN) 50 MG tablet TAKE 1 TABLET BY MOUTH EVERY DAY 90 tablet 0    rasagiline mesylate 1 MG TABS Take 0.5 tablets by mouth daily       carbidopa-levodopa (SINEMET)  MG per tablet Take 1 tablet by mouth 4 times daily      aspirin 81 MG tablet Take 81 mg by mouth daily      Multiple Vitamins-Minerals (MULTIVITAMIN ADULT PO) Take 1 tablet by mouth daily      Calcium Carbonate-Vitamin D (CALCIUM + D PO) Take 1 tablet by mouth daily      B Complex Vitamins (VITAMIN B-COMPLEX PO) Take 1 tablet by mouth daily       No current facility-administered medications for this visit.      Past Medical History:   Diagnosis Date    Anxiety     Chronic right shoulder pain     Colon polyps 04/28/2017    Radha Martel MD    Diverticulosis of colon 04/28/2017    Colonoscopy    Epididymitis     Essential hypertension     Hx of skin cancer, basal cell     Face / Had Mohs surgery    Hyperglycemia     Idiopathic Parkinson's disease ([facility] [address]     Dr. Jessa Hathaway Internal hemorrhoids 04/28/2017    Colonoscopy    Major depression single episode, in partial remission ([facility] [address]     Primary osteoarthritis involving multiple joints     S/P Left Hip Replacement     Past Surgical History:   Procedure Laterality Date    HERNIA REPAIR Right     HERNIA REPAIR Bilateral     MOHS SURGERY  2022    Face    TOOTH EXTRACTION      TOTAL HIP ARTHROPLASTY Left 03/15/2016    Dr. Ann Legions EXTRACTION       Social History     Tobacco Use    Smoking status: Former Smoker     Packs/day: 1.00     Years: 6.00     Pack years: 6.00     Types: Cigarettes     Quit date: 1977     Years since quittin.0    Smokeless tobacco: Never Used   Vaping Use    Vaping Use: Never used   Substance Use Topics    Alcohol use: Yes     Comment: 2-3 beers daily    Drug use: No     Family History   Problem Relation Age of Onset    Heart Disease Father         MI   Flynn Rudder Arthritis Mother     High Blood Pressure Mother     High Blood Pressure Sister     Diabetes Maternal Grandfather     Heart Disease Paternal Grandfather         MI        Review of Systems   Constitutional: Negative for chills and fever. HENT: Negative for congestion, rhinorrhea and sore throat. Respiratory: Negative for cough, shortness of breath and wheezing. Cardiovascular: Negative for chest pain, palpitations and leg swelling. Gastrointestinal: Positive for constipation. Negative for abdominal pain, blood in stool, diarrhea, nausea and vomiting. Genitourinary: Negative for dysuria, frequency, hematuria and urgency. Musculoskeletal: Positive for arthralgias. Psychiatric/Behavioral: Negative for dysphoric mood and suicidal ideas. /80   Pulse 72   Temp 97.9 °F (36.6 °C)   Ht 5' 10\" (1.778 m)   Wt 130 lb 9.6 oz (59.2 kg)   SpO2 97%   BMI 18.74 kg/m²    Objective:   Physical Exam  Vitals reviewed. Constitutional:       General: He is not in acute distress. Appearance: He is well-developed. HENT:      Head: Normocephalic. Right Ear: External ear normal.      Left Ear: External ear normal.   Neck:      Thyroid: No thyromegaly.       Vascular: No carotid bruit or JVD.   Cardiovascular:      Rate and Rhythm: Normal rate and regular rhythm. Heart sounds: Normal heart sounds. No murmur heard. Pulmonary:      Effort: Pulmonary effort is normal.      Breath sounds: Normal breath sounds. No wheezing or rales. Abdominal:      General: Bowel sounds are normal. There is no distension. Palpations: Abdomen is soft. There is no mass. Tenderness: There is no abdominal tenderness. There is no guarding or rebound. Hernia: No hernia is present. Lymphadenopathy:      Cervical: No cervical adenopathy. Neurological:      Mental Status: He is alert and oriented to person, place, and time. Assessment:      Preoperative Physical  Primary Osteoarthritis Right Hip       Plan:      Patient is cleared for surgery   Patient to hold Ibuprofen, Aspirin and Vitamins until after surgery.     RTO 3 months for Hypertension         ALYSA JUNIOR, DO

## 2022-04-19 ENCOUNTER — OFFICE VISIT (OUTPATIENT)
Dept: FAMILY MEDICINE CLINIC | Age: 67
End: 2022-04-19
Payer: MEDICARE

## 2022-04-19 ENCOUNTER — PREP FOR PROCEDURE (OUTPATIENT)
Dept: ORTHOPEDICS UNIT | Age: 67
End: 2022-04-19

## 2022-04-19 VITALS
SYSTOLIC BLOOD PRESSURE: 122 MMHG | HEIGHT: 70 IN | DIASTOLIC BLOOD PRESSURE: 80 MMHG | HEART RATE: 72 BPM | WEIGHT: 130.6 LBS | OXYGEN SATURATION: 97 % | BODY MASS INDEX: 18.7 KG/M2 | TEMPERATURE: 97.9 F

## 2022-04-19 DIAGNOSIS — M16.11 PRIMARY OSTEOARTHRITIS OF RIGHT HIP: ICD-10-CM

## 2022-04-19 DIAGNOSIS — Z01.818 PREOP GENERAL PHYSICAL EXAM: Primary | ICD-10-CM

## 2022-04-19 PROBLEM — Z85.828 HX OF SKIN CANCER, BASAL CELL: Status: ACTIVE | Noted: 2022-04-19

## 2022-04-19 PROCEDURE — 99214 OFFICE O/P EST MOD 30 MIN: CPT | Performed by: FAMILY MEDICINE

## 2022-04-19 RX ORDER — MELOXICAM 7.5 MG/1
15 TABLET ORAL ONCE
Status: CANCELLED | OUTPATIENT
Start: 2022-04-19 | End: 2022-04-19

## 2022-04-19 ASSESSMENT — ENCOUNTER SYMPTOMS
WHEEZING: 0
COUGH: 0
DIARRHEA: 0
VOMITING: 0
CONSTIPATION: 1
SORE THROAT: 0
NAUSEA: 0
ABDOMINAL PAIN: 0
SHORTNESS OF BREATH: 0
BLOOD IN STOOL: 0
RHINORRHEA: 0

## 2022-04-20 ENCOUNTER — TELEPHONE (OUTPATIENT)
Dept: ORTHOPEDIC SURGERY | Age: 67
End: 2022-04-20

## 2022-04-20 NOTE — TELEPHONE ENCOUNTER
General Question     Subject: PATIENT HAS QUESTIONS REGARDING MEDICATION PRIOR TO SX.  PLEASE ADVISE   Patient: Vane Harman  Contact Number: [phone]

## 2022-04-22 ENCOUNTER — ANESTHESIA EVENT (OUTPATIENT)
Dept: OPERATING ROOM | Age: 67
End: 2022-04-22
Payer: MEDICARE

## 2022-04-22 NOTE — DISCHARGE INSTR - COC
[facility] ([facility]) Continuity of Care Form    Patient Name:  Luke Hernandez  : 1955    MRN:  [id number]    Admit date:  (Not on file)  Discharge date:  2022    Code Status Order: Prior  Advance Directives: Yes    Admitting Physician: Mikey Johnson MD  PCP: Chase Marks DO    Discharging Nurse: [facility] Unit/Room#: No information available for this encounter. Discharging Unit Phone Number: [phone]    Emergency Contact:        Past Surgical History:  Past Surgical History:   Procedure Laterality Date    HERNIA REPAIR Right     HERNIA REPAIR Bilateral     MOHS SURGERY  2022    Face    TOOTH EXTRACTION      TOTAL HIP ARTHROPLASTY Left 03/15/2016    Dr. Alma Rosa Marina EXTRACTION         Immunization History:   Immunization History   Administered Date(s) Administered    COVID-19, Climmie Messier, Primary or Immunocompromised, PF, 100mcg/0.5mL 2021, 04/15/2021    Pneumococcal Polysaccharide ([id number]) 2021    Tdap (Boostrix, Adacel) 2014    Zoster Recombinant (Shingrix) 2018, 09/10/2018       Active Problems:  Active Problems:    * No active hospital problems. *  Resolved Problems:    * No resolved hospital problems. *      Isolation/Infection:       Nurse Assessment:  Last Vital Signs:Ht 5' 10\" (1.778 m)   Wt 130 lb (59 kg)   BMI 18.65 kg/m²   Last documented pain score (0-10 scale):    Last Weight:   Wt Readings from Last 1 Encounters:   22 130 lb 9.6 oz (59.2 kg)     Mental Status:  oriented and alert     IV Access:  - None    Nursing Mobility/ADLs:  Walking   Assisted  Transfer  Assisted  Bathing  Assisted  Dressing  Assisted  Toileting  Assisted  Feeding  Independent  Med Admin  Independent  Med Delivery   whole    Wound Care Documentation and Therapy:  Keep glued Prineo dressing clean and intact. DO NOT remove. This is waterproof for showering. Please do not use lotion on dressing.   The separate lower tan dressing can be removed in 2 days and no dressing is needed on the small wound. Removal of Prineo dressing will be discussed at postop visit in 2 weeks at office. Incision 03/15/16 Hip Left;Lateral (Active)   Number of days: 2229        Elimination:  Urinary Catheter: None   Colostomy/Ileostomy: No  Continence: Bowel: Yes  Bladder: Yes  Date of Last BM: 4/24/2022    Intake/Output Summary (Last 24 hours)   No intake or output data in the 24 hours ending 04/22/22 2715  Safety Concerns: At Risk for Falls    Impairments/Disabilities:      Vision    Nutrition Therapy:  Current Nutrition Therapy: general  Routes of Feeding: Oral  Liquids: No Restrictions  Daily Fluid Restriction: no  Last Modified Barium Swallow with Video (Video Swallowing Test): not done    Treatments at the Time of Hospital Discharge:   Respiratory Treatments:   Oxygen Therapy:  is not on home oxygen therapy. Ventilator:    - No ventilator support    Lab orders for discharge:        Rehab Therapies: Physical Therapy, Occupational Therapy and nursing care. See pt 4-5 times a week for the first week then 3 times a week thereafter. Weight Bearing Status/Restrictions: No weight bearing restirctions  Other Medical Equipment (for information only, NOT a DME order):  Rolling walker  Other Treatments: Anticoagulation medications must be taken as ordered, Bilateral knee high RICHAR hose for 2 weeks after surgery. Please review application of RICHAR hose with pt.    HOME HEALTH CARE: LEVEL 1 STANDARD    Home health agency to establish plan of care for patient over 60 day period   Nursing  Initial home SN evaluation visit to occur within 24-48 hours for:  medication management  VS and clinical assessment  S&S chronic disease exacerbation education + when to contact MD / NP  care coordination  Medication Reconciliation during 1st SN visit       PT/OT   Evaluate with goal of regaining prior level of functioning   OT to evaluate if patient has [address] needs for personal care PCP Visit scheduled within 7 days of hospital discharge       Patient's personal belongings (please select all that are sent with patient):  Glasses    RN SIGNATURE:  Electronically signed by Leticia Green RN on 4/26/22 at 9:46 AM EDT    PHYSICIAN SECTION    Prognosis: Good    Condition at Discharge: Stable    Rehab Potential (if transferring to Rehab): Good    Physician Certification: I certify the above orders, information, and transfer of Job Meiers is necessary for the continuing treatment of the diagnosis listed and that he requires [address] for less 30 days. Update Admission H&P: No change in H&P    PHYSICIAN SIGNATURE:  Electronically signed by BRISEIDA Washington CNP on 4/22/22 at 1:45 PM EDT/ Dr Yvonne Lyle Status Date: 4/25/22-OBS    [facility] Readmission Risk Assessment Score:    Discharging to Facility/ Agency   Name:  [facility] care    Address: [address] [place] [address], [address] [place] [address]  Phone: [phone]  Fax: [phone]      / signature: Electronically signed by Mell Mcnamara on 4/26/22 at 10:45 AM EDT            DISCHARGE INSTRUCTIONS FOR TOTAL JOINT REPLACEMENT  Activity:  Elevate your leg if swelling occurs in your ankle. Use elastic wraps/hose until swelling decreases. Continue the exercise program as prescribed by physical therapists. Take frequent walks. Use walker, crutches, or cane with weight bearing instructions as indicated by the physical therapists. Take rest periods often. Elevate leg during rest period. Hip Replacement Only: Follow these instructions for a minimum of 3 months or until instructed by Dr Candance Long. Avoid sitting in low chairs or toilets without raised seats. Keep knees apart. Sleep with a pillow between your legs. Do not cross your legs, especially when putting on shoes and socks. WOUND CARE:Do not scrub wound.   Pat it dry with a soft towel.  Dont apply any lotions or creams to your wound. Check the incision every day for redness, swelling, or increase in drainage. Diet:  You can resume your normal diet. There are no limits on your diet due to your surgery. Pain pills and activity changes may lead to constipation. To prevent this, use prune juice or bran cereals liberally. You may need to use a laxative such as Dulcolax, Senokot, or Milk of Magnesia. Drink plenty of fluids. Medications: Take pain pills as ordered to maintain comfort. Never drive while taking pain medicine. Avoid over the counter medications until checking with your doctor. Resume previous medications as instructed by your doctor. Take blood thinners as directed and until completed. Call Your Doctor If:  You have increased pain not controlled by medications. Excessive swelling in your ankle. You develop numbness, tingling, or decreased movement. You have a fever greater than 100 degrees for a day or over 101 degrees at any one time. Your wound becomes more reddened, starts draining, or opens. If you fall. You have any questions about your recovery. Inform your family doctor/dentist or any other doctor who cares for you in the future that you have a joint replacement. They may want to order antibiotics for dental or surgical procedures. If you have required the use of insulin to control your blood sugar after surgery, follow up with your family doctor. Call your surgeons office to schedule your appointment to be seen 2 to 3 weeks after surgery. Make your appointment to continue physical therapy per doctors orders.   Smoking cessation assistance can be obtained from your family doctor or by calling [place] @ [phone]    _______________________________   _____   _______________________  ____                Patient Signature              Date      Witness                               Date           Posterior Approach   The main positions and movements to avoid after a posterior approach include crossing your legs, turning your hip and leg inward, or bending the hip more than 90 degrees. Don't cross your legs. When sitting, do not cross your affected leg. When lying on your back, don't roll your affected leg toward the other leg as you might do when rolling over. A pillow or triangular-shaped wedge may be used to block the legs from crossing. Don't roll your leg and foot in. Use a pillow between your legs when lying in bed to keep your leg from rolling inward. Don't allow the knee of your operated leg to cross the midline of your body. This means don't let your knee move across your body past your navel (belly button). When lying in bed, place pillows between your legs to keep your hip in the correct position. Don't turn your upper body toward your sore hip. When sitting, swivel your whole body rather than turning your upper body toward your hip. Don't twist your body toward your operated hip. This means don't stand pigeon-toed. Keep the toes of your affected leg pointed forward when you stand, sit, or walk. If you turn your body in the direction of your surgical hip without pivoting your foot, your hip will be placed in an unsafe position. Remember to lift and turn your foot as you turn in the same direction as your surgical hip. Don't bend the hip past ninety degrees. This means do not lean too far forward when sitting up in bed. Also, raising your knee up in bed can cause the hip angle to go past ninety degrees. To avoid bending past ninety degrees when sitting in a chair, lean back slightly. Don't bend over past ninety degrees at the waist. Your hip may go past ninety degrees if you bend over at the waist to tie your shoes or  items off the floor. Instead, use a reacher to put on your shoes and socks or to  items from the floor.

## 2022-04-25 ENCOUNTER — ANESTHESIA (OUTPATIENT)
Dept: OPERATING ROOM | Age: 67
End: 2022-04-25
Payer: MEDICARE

## 2022-04-25 ENCOUNTER — HOSPITAL ENCOUNTER (OUTPATIENT)
Age: 67
Setting detail: OBSERVATION
Discharge: HOME HEALTH | End: 2022-04-26
Attending: ORTHOPAEDIC SURGERY | Admitting: ORTHOPAEDIC SURGERY
Payer: MEDICARE

## 2022-04-25 ENCOUNTER — APPOINTMENT (OUTPATIENT)
Dept: GENERAL RADIOLOGY | Age: 67
End: 2022-04-25
Attending: ORTHOPAEDIC SURGERY
Payer: MEDICARE

## 2022-04-25 VITALS
OXYGEN SATURATION: 100 % | SYSTOLIC BLOOD PRESSURE: 125 MMHG | DIASTOLIC BLOOD PRESSURE: 73 MMHG | TEMPERATURE: 96.3 F | RESPIRATION RATE: 21 BRPM

## 2022-04-25 DIAGNOSIS — M16.11 OSTEOARTHRITIS OF RIGHT HIP, UNSPECIFIED OSTEOARTHRITIS TYPE: ICD-10-CM

## 2022-04-25 DIAGNOSIS — M16.11 PRIMARY OSTEOARTHRITIS OF RIGHT HIP: ICD-10-CM

## 2022-04-25 LAB
ABO/RH: NORMAL
ANTIBODY SCREEN: NORMAL
ESTIMATED AVERAGE GLUCOSE: 111.2 MG/DL
GLUCOSE BLD-MCNC: 138 MG/DL (ref 70–99)
GLUCOSE BLD-MCNC: 151 MG/DL (ref 70–99)
HBA1C MFR BLD: 5.5 %
PERFORMED ON: ABNORMAL
PERFORMED ON: ABNORMAL

## 2022-04-25 PROCEDURE — 2580000003 HC RX 258: Performed by: ANESTHESIOLOGY

## 2022-04-25 PROCEDURE — 97530 THERAPEUTIC ACTIVITIES: CPT

## 2022-04-25 PROCEDURE — A4217 STERILE WATER/SALINE, 500 ML: HCPCS | Performed by: ORTHOPAEDIC SURGERY

## 2022-04-25 PROCEDURE — 3700000000 HC ANESTHESIA ATTENDED CARE: Performed by: ORTHOPAEDIC SURGERY

## 2022-04-25 PROCEDURE — 88304 TISSUE EXAM BY PATHOLOGIST: CPT

## 2022-04-25 PROCEDURE — 6370000000 HC RX 637 (ALT 250 FOR IP): Performed by: ANESTHESIOLOGY

## 2022-04-25 PROCEDURE — 86850 RBC ANTIBODY SCREEN: CPT

## 2022-04-25 PROCEDURE — G0378 HOSPITAL OBSERVATION PER HR: HCPCS

## 2022-04-25 PROCEDURE — 88311 DECALCIFY TISSUE: CPT

## 2022-04-25 PROCEDURE — 73501 X-RAY EXAM HIP UNI 1 VIEW: CPT

## 2022-04-25 PROCEDURE — C1776 JOINT DEVICE (IMPLANTABLE): HCPCS | Performed by: ORTHOPAEDIC SURGERY

## 2022-04-25 PROCEDURE — 2709999900 HC NON-CHARGEABLE SUPPLY: Performed by: ORTHOPAEDIC SURGERY

## 2022-04-25 PROCEDURE — 6370000000 HC RX 637 (ALT 250 FOR IP): Performed by: ORTHOPAEDIC SURGERY

## 2022-04-25 PROCEDURE — 6360000002 HC RX W HCPCS: Performed by: ORTHOPAEDIC SURGERY

## 2022-04-25 PROCEDURE — 86901 BLOOD TYPING SEROLOGIC RH(D): CPT

## 2022-04-25 PROCEDURE — 94640 AIRWAY INHALATION TREATMENT: CPT

## 2022-04-25 PROCEDURE — 97535 SELF CARE MNGMENT TRAINING: CPT

## 2022-04-25 PROCEDURE — 97166 OT EVAL MOD COMPLEX 45 MIN: CPT

## 2022-04-25 PROCEDURE — 6360000002 HC RX W HCPCS: Performed by: ANESTHESIOLOGY

## 2022-04-25 PROCEDURE — 2500000003 HC RX 250 WO HCPCS

## 2022-04-25 PROCEDURE — 7100000000 HC PACU RECOVERY - FIRST 15 MIN: Performed by: ORTHOPAEDIC SURGERY

## 2022-04-25 PROCEDURE — 6360000002 HC RX W HCPCS: Performed by: NURSE ANESTHETIST, CERTIFIED REGISTERED

## 2022-04-25 PROCEDURE — 3600000015 HC SURGERY LEVEL 5 ADDTL 15MIN: Performed by: ORTHOPAEDIC SURGERY

## 2022-04-25 PROCEDURE — 97116 GAIT TRAINING THERAPY: CPT

## 2022-04-25 PROCEDURE — 86900 BLOOD TYPING SEROLOGIC ABO: CPT

## 2022-04-25 PROCEDURE — 2500000003 HC RX 250 WO HCPCS: Performed by: NURSE ANESTHETIST, CERTIFIED REGISTERED

## 2022-04-25 PROCEDURE — 7100000001 HC PACU RECOVERY - ADDTL 15 MIN: Performed by: ORTHOPAEDIC SURGERY

## 2022-04-25 PROCEDURE — 97162 PT EVAL MOD COMPLEX 30 MIN: CPT

## 2022-04-25 PROCEDURE — 2580000003 HC RX 258: Performed by: ORTHOPAEDIC SURGERY

## 2022-04-25 PROCEDURE — 3600000005 HC SURGERY LEVEL 5 BASE: Performed by: ORTHOPAEDIC SURGERY

## 2022-04-25 PROCEDURE — 83036 HEMOGLOBIN GLYCOSYLATED A1C: CPT

## 2022-04-25 PROCEDURE — 36415 COLL VENOUS BLD VENIPUNCTURE: CPT

## 2022-04-25 PROCEDURE — 2700000000 HC OXYGEN THERAPY PER DAY

## 2022-04-25 PROCEDURE — 3700000001 HC ADD 15 MINUTES (ANESTHESIA): Performed by: ORTHOPAEDIC SURGERY

## 2022-04-25 PROCEDURE — 94760 N-INVAS EAR/PLS OXIMETRY 1: CPT

## 2022-04-25 DEVICE — WIRE WITH EYE: Type: IMPLANTABLE DEVICE | Site: HIP | Status: FUNCTIONAL

## 2022-04-25 DEVICE — HEAD FEM DIA40MM +8.5MM OFFSET 12/14 TAPR HIP CERAMIC TI SL: Type: IMPLANTABLE DEVICE | Site: HIP | Status: FUNCTIONAL

## 2022-04-25 DEVICE — CUP ACET DIA56MM HIP GRIPTION PRI CEMENTLESS FIX SECT SER: Type: IMPLANTABLE DEVICE | Site: HIP | Status: FUNCTIONAL

## 2022-04-25 DEVICE — LINER ACET OD56MM ID40MM +4MM OFFSET HIP POLYETH MTL ON: Type: IMPLANTABLE DEVICE | Site: HIP | Status: FUNCTIONAL

## 2022-04-25 DEVICE — STEM FEM SZ 4 L103MM 12/14 TAPR HI OFFSET HIP DUOFIX CLLRD: Type: IMPLANTABLE DEVICE | Site: HIP | Status: FUNCTIONAL

## 2022-04-25 RX ORDER — ROCURONIUM BROMIDE 10 MG/ML
INJECTION, SOLUTION INTRAVENOUS PRN
Status: DISCONTINUED | OUTPATIENT
Start: 2022-04-25 | End: 2022-04-25 | Stop reason: SDUPTHER

## 2022-04-25 RX ORDER — AMLODIPINE BESYLATE 5 MG/1
5 TABLET ORAL DAILY
Status: DISCONTINUED | OUTPATIENT
Start: 2022-04-26 | End: 2022-04-26 | Stop reason: HOSPADM

## 2022-04-25 RX ORDER — OXYCODONE HYDROCHLORIDE 5 MG/1
5 TABLET ORAL EVERY 4 HOURS PRN
Status: DISCONTINUED | OUTPATIENT
Start: 2022-04-25 | End: 2022-04-26 | Stop reason: HOSPADM

## 2022-04-25 RX ORDER — LISINOPRIL 20 MG/1
20 TABLET ORAL DAILY
Status: DISCONTINUED | OUTPATIENT
Start: 2022-04-26 | End: 2022-04-26 | Stop reason: HOSPADM

## 2022-04-25 RX ORDER — SODIUM CHLORIDE 9 MG/ML
INJECTION, SOLUTION INTRAVENOUS PRN
Status: DISCONTINUED | OUTPATIENT
Start: 2022-04-25 | End: 2022-04-25 | Stop reason: HOSPADM

## 2022-04-25 RX ORDER — NICOTINE POLACRILEX 4 MG
15 LOZENGE BUCCAL PRN
Status: DISCONTINUED | OUTPATIENT
Start: 2022-04-25 | End: 2022-04-26 | Stop reason: HOSPADM

## 2022-04-25 RX ORDER — ASPIRIN 81 MG/1
81 TABLET ORAL 2 TIMES DAILY
Status: DISCONTINUED | OUTPATIENT
Start: 2022-04-26 | End: 2022-04-26 | Stop reason: HOSPADM

## 2022-04-25 RX ORDER — LIDOCAINE HYDROCHLORIDE 20 MG/ML
INJECTION, SOLUTION EPIDURAL; INFILTRATION; INTRACAUDAL; PERINEURAL PRN
Status: DISCONTINUED | OUTPATIENT
Start: 2022-04-25 | End: 2022-04-25 | Stop reason: SDUPTHER

## 2022-04-25 RX ORDER — SODIUM CHLORIDE 0.9 % (FLUSH) 0.9 %
5-40 SYRINGE (ML) INJECTION PRN
Status: DISCONTINUED | OUTPATIENT
Start: 2022-04-25 | End: 2022-04-26 | Stop reason: HOSPADM

## 2022-04-25 RX ORDER — SODIUM CHLORIDE 0.9 % (FLUSH) 0.9 %
5-40 SYRINGE (ML) INJECTION EVERY 12 HOURS SCHEDULED
Status: DISCONTINUED | OUTPATIENT
Start: 2022-04-25 | End: 2022-04-25 | Stop reason: HOSPADM

## 2022-04-25 RX ORDER — CARBIDOPA/LEVODOPA 25MG-250MG
1 TABLET ORAL 4 TIMES DAILY
Status: DISCONTINUED | OUTPATIENT
Start: 2022-04-25 | End: 2022-04-25

## 2022-04-25 RX ORDER — ONDANSETRON 2 MG/ML
INJECTION INTRAMUSCULAR; INTRAVENOUS PRN
Status: DISCONTINUED | OUTPATIENT
Start: 2022-04-25 | End: 2022-04-25 | Stop reason: SDUPTHER

## 2022-04-25 RX ORDER — SODIUM CHLORIDE 9 MG/ML
INJECTION, SOLUTION INTRAVENOUS PRN
Status: DISCONTINUED | OUTPATIENT
Start: 2022-04-25 | End: 2022-04-26 | Stop reason: HOSPADM

## 2022-04-25 RX ORDER — SODIUM CHLORIDE 0.9 % (FLUSH) 0.9 %
5-40 SYRINGE (ML) INJECTION PRN
Status: DISCONTINUED | OUTPATIENT
Start: 2022-04-25 | End: 2022-04-25 | Stop reason: HOSPADM

## 2022-04-25 RX ORDER — PHENYLEPHRINE HCL IN 0.9% NACL 1 MG/10 ML
SYRINGE (ML) INTRAVENOUS PRN
Status: DISCONTINUED | OUTPATIENT
Start: 2022-04-25 | End: 2022-04-25 | Stop reason: SDUPTHER

## 2022-04-25 RX ORDER — AMLODIPINE BESYLATE AND BENAZEPRIL HYDROCHLORIDE 5; 20 MG/1; MG/1
1 CAPSULE ORAL DAILY
Status: DISCONTINUED | OUTPATIENT
Start: 2022-04-25 | End: 2022-04-25 | Stop reason: CLARIF

## 2022-04-25 RX ORDER — SODIUM CHLORIDE 9 MG/ML
INJECTION, SOLUTION INTRAVENOUS CONTINUOUS
Status: DISCONTINUED | OUTPATIENT
Start: 2022-04-25 | End: 2022-04-25 | Stop reason: HOSPADM

## 2022-04-25 RX ORDER — INSULIN GLARGINE 100 [IU]/ML
0.25 INJECTION, SOLUTION SUBCUTANEOUS NIGHTLY
Status: DISCONTINUED | OUTPATIENT
Start: 2022-04-25 | End: 2022-04-26

## 2022-04-25 RX ORDER — SODIUM CHLORIDE 0.9 % (FLUSH) 0.9 %
5-40 SYRINGE (ML) INJECTION EVERY 12 HOURS SCHEDULED
Status: DISCONTINUED | OUTPATIENT
Start: 2022-04-25 | End: 2022-04-26 | Stop reason: HOSPADM

## 2022-04-25 RX ORDER — PROPOFOL 10 MG/ML
INJECTION, EMULSION INTRAVENOUS PRN
Status: DISCONTINUED | OUTPATIENT
Start: 2022-04-25 | End: 2022-04-25 | Stop reason: SDUPTHER

## 2022-04-25 RX ORDER — FENTANYL CITRATE 50 UG/ML
INJECTION, SOLUTION INTRAMUSCULAR; INTRAVENOUS PRN
Status: DISCONTINUED | OUTPATIENT
Start: 2022-04-25 | End: 2022-04-25 | Stop reason: SDUPTHER

## 2022-04-25 RX ORDER — OXYCODONE HYDROCHLORIDE 5 MG/1
5 TABLET ORAL
Status: DISCONTINUED | OUTPATIENT
Start: 2022-04-25 | End: 2022-04-25 | Stop reason: HOSPADM

## 2022-04-25 RX ORDER — MIDAZOLAM HYDROCHLORIDE 1 MG/ML
INJECTION INTRAMUSCULAR; INTRAVENOUS PRN
Status: DISCONTINUED | OUTPATIENT
Start: 2022-04-25 | End: 2022-04-25 | Stop reason: SDUPTHER

## 2022-04-25 RX ORDER — ONDANSETRON 2 MG/ML
4 INJECTION INTRAMUSCULAR; INTRAVENOUS EVERY 6 HOURS PRN
Status: DISCONTINUED | OUTPATIENT
Start: 2022-04-25 | End: 2022-04-26 | Stop reason: HOSPADM

## 2022-04-25 RX ORDER — MAGNESIUM HYDROXIDE 1200 MG/15ML
LIQUID ORAL CONTINUOUS PRN
Status: DISCONTINUED | OUTPATIENT
Start: 2022-04-25 | End: 2022-04-25 | Stop reason: HOSPADM

## 2022-04-25 RX ORDER — ONDANSETRON 2 MG/ML
4 INJECTION INTRAMUSCULAR; INTRAVENOUS
Status: DISCONTINUED | OUTPATIENT
Start: 2022-04-25 | End: 2022-04-25 | Stop reason: HOSPADM

## 2022-04-25 RX ORDER — INSULIN LISPRO 100 [IU]/ML
0-3 INJECTION, SOLUTION INTRAVENOUS; SUBCUTANEOUS NIGHTLY
Status: DISCONTINUED | OUTPATIENT
Start: 2022-04-25 | End: 2022-04-26

## 2022-04-25 RX ORDER — DEXTROSE MONOHYDRATE 25 G/50ML
12.5 INJECTION, SOLUTION INTRAVENOUS PRN
Status: DISCONTINUED | OUTPATIENT
Start: 2022-04-25 | End: 2022-04-26 | Stop reason: HOSPADM

## 2022-04-25 RX ORDER — ONDANSETRON 4 MG/1
4 TABLET, ORALLY DISINTEGRATING ORAL EVERY 8 HOURS PRN
Status: DISCONTINUED | OUTPATIENT
Start: 2022-04-25 | End: 2022-04-26 | Stop reason: HOSPADM

## 2022-04-25 RX ORDER — DEXAMETHASONE SODIUM PHOSPHATE 4 MG/ML
INJECTION, SOLUTION INTRA-ARTICULAR; INTRALESIONAL; INTRAMUSCULAR; INTRAVENOUS; SOFT TISSUE PRN
Status: DISCONTINUED | OUTPATIENT
Start: 2022-04-25 | End: 2022-04-25 | Stop reason: SDUPTHER

## 2022-04-25 RX ORDER — CARBIDOPA/LEVODOPA 25MG-250MG
1 TABLET ORAL ONCE
Status: COMPLETED | OUTPATIENT
Start: 2022-04-25 | End: 2022-04-25

## 2022-04-25 RX ORDER — ACETAMINOPHEN 325 MG/1
650 TABLET ORAL EVERY 6 HOURS
Status: DISCONTINUED | OUTPATIENT
Start: 2022-04-25 | End: 2022-04-26 | Stop reason: HOSPADM

## 2022-04-25 RX ORDER — SENNA AND DOCUSATE SODIUM 50; 8.6 MG/1; MG/1
1 TABLET, FILM COATED ORAL 2 TIMES DAILY
Status: DISCONTINUED | OUTPATIENT
Start: 2022-04-25 | End: 2022-04-26 | Stop reason: HOSPADM

## 2022-04-25 RX ORDER — ACETAMINOPHEN 500 MG
1000 TABLET ORAL ONCE
Status: COMPLETED | OUTPATIENT
Start: 2022-04-25 | End: 2022-04-25

## 2022-04-25 RX ORDER — SODIUM CHLORIDE 9 MG/ML
INJECTION, SOLUTION INTRAVENOUS CONTINUOUS
Status: DISCONTINUED | OUTPATIENT
Start: 2022-04-25 | End: 2022-04-26 | Stop reason: HOSPADM

## 2022-04-25 RX ORDER — OXYCODONE HYDROCHLORIDE 5 MG/1
5-10 TABLET ORAL EVERY 6 HOURS PRN
Qty: 40 TABLET | Refills: 0 | Status: SHIPPED | OUTPATIENT
Start: 2022-04-25 | End: 2022-05-12 | Stop reason: SDUPTHER

## 2022-04-25 RX ORDER — INSULIN LISPRO 100 [IU]/ML
0-6 INJECTION, SOLUTION INTRAVENOUS; SUBCUTANEOUS
Status: DISCONTINUED | OUTPATIENT
Start: 2022-04-25 | End: 2022-04-26

## 2022-04-25 RX ORDER — MELOXICAM 7.5 MG/1
15 TABLET ORAL ONCE
Status: COMPLETED | OUTPATIENT
Start: 2022-04-25 | End: 2022-04-25

## 2022-04-25 RX ORDER — INSULIN LISPRO 100 [IU]/ML
0.08 INJECTION, SOLUTION INTRAVENOUS; SUBCUTANEOUS
Status: DISCONTINUED | OUTPATIENT
Start: 2022-04-25 | End: 2022-04-26

## 2022-04-25 RX ORDER — IBUPROFEN 800 MG/1
800 TABLET ORAL
Qty: 90 TABLET | Refills: 0
Start: 2022-04-25 | End: 2022-05-01

## 2022-04-25 RX ORDER — ATENOLOL 50 MG/1
50 TABLET ORAL DAILY
Status: DISCONTINUED | OUTPATIENT
Start: 2022-04-26 | End: 2022-04-26 | Stop reason: HOSPADM

## 2022-04-25 RX ORDER — OXYCODONE HYDROCHLORIDE 10 MG/1
10 TABLET ORAL EVERY 4 HOURS PRN
Status: DISCONTINUED | OUTPATIENT
Start: 2022-04-25 | End: 2022-04-26 | Stop reason: HOSPADM

## 2022-04-25 RX ORDER — CARBIDOPA/LEVODOPA 25MG-250MG
1 TABLET ORAL 3 TIMES DAILY
Status: DISCONTINUED | OUTPATIENT
Start: 2022-04-25 | End: 2022-04-26 | Stop reason: HOSPADM

## 2022-04-25 RX ORDER — DEXTROSE MONOHYDRATE 50 MG/ML
100 INJECTION, SOLUTION INTRAVENOUS PRN
Status: DISCONTINUED | OUTPATIENT
Start: 2022-04-25 | End: 2022-04-26 | Stop reason: HOSPADM

## 2022-04-25 RX ADMIN — ACETAMINOPHEN 650 MG: 325 TABLET ORAL at 18:09

## 2022-04-25 RX ADMIN — FENTANYL CITRATE 50 MCG: 50 INJECTION INTRAMUSCULAR; INTRAVENOUS at 07:35

## 2022-04-25 RX ADMIN — FENTANYL CITRATE 25 MCG: 50 INJECTION INTRAMUSCULAR; INTRAVENOUS at 07:58

## 2022-04-25 RX ADMIN — SODIUM CHLORIDE: 9 INJECTION, SOLUTION INTRAVENOUS at 07:20

## 2022-04-25 RX ADMIN — ROCURONIUM BROMIDE 50 MG: 10 INJECTION INTRAVENOUS at 07:37

## 2022-04-25 RX ADMIN — Medication 50 MCG: at 07:52

## 2022-04-25 RX ADMIN — Medication 50 MCG: at 08:58

## 2022-04-25 RX ADMIN — SENNOSIDES AND DOCUSATE SODIUM 1 TABLET: 50; 8.6 TABLET ORAL at 12:45

## 2022-04-25 RX ADMIN — ACETAMINOPHEN 650 MG: 325 TABLET ORAL at 12:26

## 2022-04-25 RX ADMIN — DEXAMETHASONE SODIUM PHOSPHATE 6 MG: 4 INJECTION, SOLUTION INTRAMUSCULAR; INTRAVENOUS at 07:59

## 2022-04-25 RX ADMIN — SODIUM CHLORIDE: 9 INJECTION, SOLUTION INTRAVENOUS at 12:30

## 2022-04-25 RX ADMIN — Medication 50 MCG: at 07:44

## 2022-04-25 RX ADMIN — MIDAZOLAM 1 MG: 1 INJECTION INTRAMUSCULAR; INTRAVENOUS at 07:28

## 2022-04-25 RX ADMIN — MELOXICAM 15 MG: 7.5 TABLET ORAL at 06:52

## 2022-04-25 RX ADMIN — CEFAZOLIN 2000 MG: 10 INJECTION, POWDER, FOR SOLUTION INTRAVENOUS at 07:28

## 2022-04-25 RX ADMIN — FENTANYL CITRATE 25 MCG: 50 INJECTION INTRAMUSCULAR; INTRAVENOUS at 08:01

## 2022-04-25 RX ADMIN — SENNOSIDES AND DOCUSATE SODIUM 1 TABLET: 50; 8.6 TABLET ORAL at 21:30

## 2022-04-25 RX ADMIN — ROCURONIUM BROMIDE 10 MG: 10 INJECTION INTRAVENOUS at 08:43

## 2022-04-25 RX ADMIN — HYDROMORPHONE HYDROCHLORIDE 0.25 MG: 1 INJECTION, SOLUTION INTRAMUSCULAR; INTRAVENOUS; SUBCUTANEOUS at 09:33

## 2022-04-25 RX ADMIN — SUGAMMADEX 150 MG: 100 INJECTION, SOLUTION INTRAVENOUS at 09:17

## 2022-04-25 RX ADMIN — CARBIDOPA AND LEVODOPA 1 TABLET: 25; 250 TABLET ORAL at 10:04

## 2022-04-25 RX ADMIN — PROPOFOL 120 MG: 10 INJECTION, EMULSION INTRAVENOUS at 07:37

## 2022-04-25 RX ADMIN — CEFAZOLIN 2000 MG: 10 INJECTION, POWDER, FOR SOLUTION INTRAVENOUS at 17:26

## 2022-04-25 RX ADMIN — OXYCODONE 5 MG: 5 TABLET ORAL at 14:04

## 2022-04-25 RX ADMIN — LIDOCAINE HYDROCHLORIDE 70 MG: 20 INJECTION, SOLUTION EPIDURAL; INFILTRATION; INTRACAUDAL; PERINEURAL at 07:37

## 2022-04-25 RX ADMIN — OXYCODONE HYDROCHLORIDE 10 MG: 10 TABLET ORAL at 18:08

## 2022-04-25 RX ADMIN — HYDROMORPHONE HYDROCHLORIDE 0.5 MG: 1 INJECTION, SOLUTION INTRAMUSCULAR; INTRAVENOUS; SUBCUTANEOUS at 10:02

## 2022-04-25 RX ADMIN — Medication 100 MCG: at 07:37

## 2022-04-25 RX ADMIN — CARBIDOPA AND LEVODOPA 1 TABLET: 25; 250 TABLET ORAL at 19:39

## 2022-04-25 RX ADMIN — HYDROMORPHONE HYDROCHLORIDE 0.5 MG: 1 INJECTION, SOLUTION INTRAMUSCULAR; INTRAVENOUS; SUBCUTANEOUS at 09:44

## 2022-04-25 RX ADMIN — Medication 50 MCG: at 07:47

## 2022-04-25 RX ADMIN — HYDROMORPHONE HYDROCHLORIDE 0.25 MG: 1 INJECTION, SOLUTION INTRAMUSCULAR; INTRAVENOUS; SUBCUTANEOUS at 09:26

## 2022-04-25 RX ADMIN — ACETAMINOPHEN 1000 MG: 500 TABLET ORAL at 06:52

## 2022-04-25 RX ADMIN — HYDROMORPHONE HYDROCHLORIDE 0.5 MG: 1 INJECTION, SOLUTION INTRAMUSCULAR; INTRAVENOUS; SUBCUTANEOUS at 09:36

## 2022-04-25 RX ADMIN — OXYCODONE HYDROCHLORIDE 10 MG: 10 TABLET ORAL at 22:20

## 2022-04-25 RX ADMIN — ONDANSETRON 4 MG: 2 INJECTION INTRAMUSCULAR; INTRAVENOUS at 08:58

## 2022-04-25 ASSESSMENT — PAIN DESCRIPTION - DESCRIPTORS
DESCRIPTORS: STABBING;DULL
DESCRIPTORS: ACHING;DISCOMFORT
DESCRIPTORS: DISCOMFORT
DESCRIPTORS: STABBING
DESCRIPTORS: DISCOMFORT;SHARP
DESCRIPTORS: ACHING;DISCOMFORT
DESCRIPTORS: DISCOMFORT
DESCRIPTORS: STABBING;SHARP
DESCRIPTORS: DISCOMFORT
DESCRIPTORS: ACHING;DISCOMFORT
DESCRIPTORS: ACHING;DISCOMFORT

## 2022-04-25 ASSESSMENT — PULMONARY FUNCTION TESTS
PIF_VALUE: 14
PIF_VALUE: 17
PIF_VALUE: 8
PIF_VALUE: 13
PIF_VALUE: 16
PIF_VALUE: 14
PIF_VALUE: 18
PIF_VALUE: 17
PIF_VALUE: 15
PIF_VALUE: 6
PIF_VALUE: 14
PIF_VALUE: 17
PIF_VALUE: 15
PIF_VALUE: 15
PIF_VALUE: 8
PIF_VALUE: 1
PIF_VALUE: 13
PIF_VALUE: 15
PIF_VALUE: 16
PIF_VALUE: 1
PIF_VALUE: 15
PIF_VALUE: 16
PIF_VALUE: 17
PIF_VALUE: 15
PIF_VALUE: 16
PIF_VALUE: 11
PIF_VALUE: 15
PIF_VALUE: 17
PIF_VALUE: 1
PIF_VALUE: 15
PIF_VALUE: 17
PIF_VALUE: 14
PIF_VALUE: 16
PIF_VALUE: 17
PIF_VALUE: 15
PIF_VALUE: 17
PIF_VALUE: 16
PIF_VALUE: 1
PIF_VALUE: 15
PIF_VALUE: 14
PIF_VALUE: 1
PIF_VALUE: 15
PIF_VALUE: 14
PIF_VALUE: 17
PIF_VALUE: 1
PIF_VALUE: 14
PIF_VALUE: 15
PIF_VALUE: 14
PIF_VALUE: 16
PIF_VALUE: 14
PIF_VALUE: 19
PIF_VALUE: 14
PIF_VALUE: 16
PIF_VALUE: 15
PIF_VALUE: 17
PIF_VALUE: 15
PIF_VALUE: 16
PIF_VALUE: 17
PIF_VALUE: 15
PIF_VALUE: 14
PIF_VALUE: 16
PIF_VALUE: 15
PIF_VALUE: 15
PIF_VALUE: 14
PIF_VALUE: 15
PIF_VALUE: 15
PIF_VALUE: 17
PIF_VALUE: 15
PIF_VALUE: 15
PIF_VALUE: 13
PIF_VALUE: 14
PIF_VALUE: 14
PIF_VALUE: 1
PIF_VALUE: 18
PIF_VALUE: 15
PIF_VALUE: 17
PIF_VALUE: 15
PIF_VALUE: 0
PIF_VALUE: 17
PIF_VALUE: 17
PIF_VALUE: 15
PIF_VALUE: 15
PIF_VALUE: 17
PIF_VALUE: 14
PIF_VALUE: 17
PIF_VALUE: 1
PIF_VALUE: 16
PIF_VALUE: 15
PIF_VALUE: 1
PIF_VALUE: 15
PIF_VALUE: 15
PIF_VALUE: 17
PIF_VALUE: 17
PIF_VALUE: 15
PIF_VALUE: 14
PIF_VALUE: 17
PIF_VALUE: 18
PIF_VALUE: 15
PIF_VALUE: 15
PIF_VALUE: 14
PIF_VALUE: 15
PIF_VALUE: 9
PIF_VALUE: 15
PIF_VALUE: 17
PIF_VALUE: 16
PIF_VALUE: 15

## 2022-04-25 ASSESSMENT — PAIN SCALES - GENERAL
PAINLEVEL_OUTOF10: 7
PAINLEVEL_OUTOF10: 9
PAINLEVEL_OUTOF10: 7
PAINLEVEL_OUTOF10: 0
PAINLEVEL_OUTOF10: 5
PAINLEVEL_OUTOF10: 6
PAINLEVEL_OUTOF10: 7
PAINLEVEL_OUTOF10: 4
PAINLEVEL_OUTOF10: 4
PAINLEVEL_OUTOF10: 9
PAINLEVEL_OUTOF10: 5

## 2022-04-25 ASSESSMENT — PAIN - FUNCTIONAL ASSESSMENT
PAIN_FUNCTIONAL_ASSESSMENT: PREVENTS OR INTERFERES SOME ACTIVE ACTIVITIES AND ADLS
PAIN_FUNCTIONAL_ASSESSMENT: PREVENTS OR INTERFERES SOME ACTIVE ACTIVITIES AND ADLS
PAIN_FUNCTIONAL_ASSESSMENT: 0-10
PAIN_FUNCTIONAL_ASSESSMENT: PREVENTS OR INTERFERES WITH MANY ACTIVE NOT PASSIVE ACTIVITIES
PAIN_FUNCTIONAL_ASSESSMENT: PREVENTS OR INTERFERES SOME ACTIVE ACTIVITIES AND ADLS
PAIN_FUNCTIONAL_ASSESSMENT: PREVENTS OR INTERFERES SOME ACTIVE ACTIVITIES AND ADLS
PAIN_FUNCTIONAL_ASSESSMENT: PREVENTS OR INTERFERES WITH MANY ACTIVE NOT PASSIVE ACTIVITIES
PAIN_FUNCTIONAL_ASSESSMENT: PREVENTS OR INTERFERES SOME ACTIVE ACTIVITIES AND ADLS
PAIN_FUNCTIONAL_ASSESSMENT: PREVENTS OR INTERFERES SOME ACTIVE ACTIVITIES AND ADLS

## 2022-04-25 ASSESSMENT — PAIN DESCRIPTION - LOCATION
LOCATION: HIP

## 2022-04-25 ASSESSMENT — PAIN DESCRIPTION - ONSET
ONSET: ON-GOING
ONSET: PROGRESSIVE
ONSET: ON-GOING

## 2022-04-25 ASSESSMENT — PAIN DESCRIPTION - FREQUENCY
FREQUENCY: CONTINUOUS
FREQUENCY: INTERMITTENT
FREQUENCY: CONTINUOUS

## 2022-04-25 ASSESSMENT — PAIN SCALES - WONG BAKER
WONGBAKER_NUMERICALRESPONSE: 6
WONGBAKER_NUMERICALRESPONSE: 0
WONGBAKER_NUMERICALRESPONSE: 4
WONGBAKER_NUMERICALRESPONSE: 0

## 2022-04-25 ASSESSMENT — PAIN DESCRIPTION - PAIN TYPE
TYPE: SURGICAL PAIN

## 2022-04-25 ASSESSMENT — PAIN DESCRIPTION - ORIENTATION
ORIENTATION: RIGHT

## 2022-04-25 ASSESSMENT — ENCOUNTER SYMPTOMS: SHORTNESS OF BREATH: 0

## 2022-04-25 NOTE — PROGRESS NOTES
[facility] Orthopedic Surgery   Progress Note      S/P :  SUBJECTIVE  In recliner. Alert and oriented. . Pain is   described in right hip and with the intensity of moderate. Pain is described as aching. OBJECTIVE              Physical                      VITALS:  BP (!) 145/88   Pulse 62   Temp 98.1 °F (36.7 °C) (Oral)   Resp 14   Ht 5' 10\" (1.778 m)   Wt 124 lb (56.2 kg)   SpO2 96%   BMI 17.79 kg/m²                     MUSCULOSKELETAL:  right foot NVI. Wiggles toes to command. Able to plantarflex and dorsiflex ankle Pedal pulses are palpable. NEUROLOGIC:                                  Sensory:  Touch:  Right Lower Extremity:  normal                                                 Surgical wound appears clean and dry right lateral  Hip with gauze and tape dressing. Ice pack on. RICHAR hose on.      Data       CBC:   Lab Results   Component Value Date    WBC 9.2 04/11/2022    RBC 4.43 04/11/2022    HGB 15.2 04/11/2022    HCT 44.6 04/11/2022    .7 04/11/2022    MCH 34.3 04/11/2022    MCHC 34.1 04/11/2022    RDW 11.9 04/11/2022     04/11/2022    MPV 8.0 04/11/2022        WBC:    Lab Results   Component Value Date    WBC 9.2 04/11/2022        Hemoglobin/Hematocrit:    Lab Results   Component Value Date    HGB 15.2 04/11/2022    HCT 44.6 04/11/2022        PT/INR:    Lab Results   Component Value Date    PROTIME 10.2 04/11/2022    INR 0.91 04/11/2022              Current Inpatient Medications             Current Facility-Administered Medications: [START ON 4/26/2022] atenolol (TENORMIN) tablet 50 mg, 50 mg, Oral, Daily  insulin glargine (LANTUS) injection vial 14 Units, 0.25 Units/kg, SubCUTAneous, Nightly  insulin lispro (HUMALOG) injection vial 4 Units, 0.08 Units/kg, SubCUTAneous, TID WC  insulin lispro (HUMALOG) injection vial 0-6 Units, 0-6 Units, SubCUTAneous, TID WC  insulin lispro (HUMALOG) injection vial 0-3 Units, 0-3 Units, SubCUTAneous, Nightly  glucose (GLUTOSE) 40 % oral gel 15 g, 15 g, Oral, PRN  dextrose 50 % IV solution, 12.5 g, IntraVENous, PRN  glucagon (rDNA) injection 1 mg, 1 mg, IntraMUSCular, PRN  dextrose 5 % solution, 100 mL/hr, IntraVENous, PRN  0.9 % sodium chloride infusion, , IntraVENous, Continuous  sodium chloride flush 0.9 % injection 5-40 mL, 5-40 mL, IntraVENous, 2 times per day  sodium chloride flush 0.9 % injection 5-40 mL, 5-40 mL, IntraVENous, PRN  0.9 % sodium chloride infusion, , IntraVENous, PRN  acetaminophen (TYLENOL) tablet 650 mg, 650 mg, Oral, Q6H  oxyCODONE (ROXICODONE) immediate release tablet 5 mg, 5 mg, Oral, Q4H PRN **OR** oxyCODONE HCl (OXY-IR) immediate release tablet 10 mg, 10 mg, Oral, Q4H PRN  HYDROmorphone (DILAUDID) injection 0.25 mg, 0.25 mg, IntraVENous, Q3H PRN **OR** HYDROmorphone (DILAUDID) injection 0.5 mg, 0.5 mg, IntraVENous, Q3H PRN  sennosides-docusate sodium (SENOKOT-S) 8.6-50 MG tablet 1 tablet, 1 tablet, Oral, BID  ceFAZolin (ANCEF) 2000 mg in dextrose 5 % 100 mL IVPB, 2,000 mg, IntraVENous, Q8H  ondansetron (ZOFRAN-ODT) disintegrating tablet 4 mg, 4 mg, Oral, Q8H PRN **OR** ondansetron (ZOFRAN) injection 4 mg, 4 mg, IntraVENous, Q6H PRN  [START ON 4/26/2022] aspirin EC tablet 81 mg, 81 mg, Oral, BID  [START ON 4/26/2022] amLODIPine (NORVASC) tablet 5 mg, 5 mg, Oral, Daily **AND** [START ON 4/26/2022] lisinopril (PRINIVIL;ZESTRIL) tablet 20 mg, 20 mg, Oral, Daily  carbidopa-levodopa (SINEMET)  MG per tablet 1 tablet, 1 tablet, Oral, TID    ASSESSMENT AND PLAN    Post right CHALO, stable exam  DVT prophylaxis ordered, ASA 81mg twice at day for 30 days for DVT prophylaxis  PT OT for ADL's and ambulation as tolerated, posterior hip precautions  SS for DC planning, home with home care tomorrow  IV or PO pain med as ordered      Mohsen Garcia, BRISEIDA - CNP  4/25/2022  3:09 PM

## 2022-04-25 NOTE — OP NOTE
Operative Note      Patient: Nguyen Diallo  YOB: 1955  MRN: [id number]    Date of Procedure: 4/25/2022    Pre-Op Diagnosis: OSTEOARTHRITIS, RIGHT HIP    Post-Op Diagnosis: Same       Procedure(s):  TOTAL HIP REPLACEMENT, RIGHT HIP    Surgeon(s):  Abdi Jenkins MD    Assistant:   Surgical Assistant: Duke Gallardo Assistant: Lisa Conteh RN    Anesthesia: General    Estimated Blood Loss (mL): 027     Complications: None    Specimens:   * No specimens in log *    Implants:  * No implants in log *      Drains: * No LDAs found *    Findings: severe OA right hip    Detailed Description of Procedure:      PATIENT NAME:         Nguyen Diallo  YOB: 1955   MEDICAL RECORD#         [id number]  SURGERY DATE:         4/25/2022  SURGEON:                 Abdi Jenkins MD          PREOPERATIVE DIAGNOSIS: Severe end-stage osteoarthritis Right Hip. POSTOPERATIVE DIAGNOSIS: Severe end-stage osteoarthritis Right hip. PROCEDURE: Right total hip arthroplasty. SURGEON: Dr. Nation Arnt: General anesthesia. IV FLUIDS: Crystalloid. ESTIMATED BLOOD LOSS: 300 ml . COMPLICATIONS: None. The patient tolerated the procedure quite well. COMPONENTS: A Depuy Actis size 4 standard body high offset stem, a size 56 [place] Gription metal cup, a size 40 + 8.5 femoral head, and a standard highly crosslinked + 4 neutral polyethylene liner with vitamin E.     INDICATIONS:  The patient is a 73 y.o. male with a long-standing   history of right hip pain. X-rays confirmed severe osteoarthritis. Despite conservative measures, the patient had severe persistent pain. Due to this   fact, the patient was ultimately cleared and scheduled for total hip arthroplasty. REPORT: The patient was identified preoperatively. The patient was then   taken to the operating room, and general anesthesia was administered by   Anesthesia.  Appropriate preoperative antibiotics were administered per SCIP   measures. The patient was then positioned in the lateral decubitus position on the pegboard. All bony   prominences were padded. We then ChloraPrepped the hip, pelvis, and   lower extremity in standard fashion. We then draped out in standard fashion. Our incision was then marked accordingly. We used a standard posterolateral   skin incision. We then sealed off the skin with an [place]an drape. We then were   ready for skin incision. Using a standard posterolateral skin incision, we   incised skin and coagulated all bleeders with Bovie electrocautery. We then   dissected down and identified the gluteofemoral fascia. This was split   longitudinally in line with the muscle fibers. We then used our Charnley   retractor and retracted both anteriorly and posteriorly. The sciatic nerve   was identified and protected at all times. We then retracted the abductors   anteriorly. We took down the piriformis. This was tagged and released. We   then took down the remainder of the short external rotators. The capsule was   then Td. We then expressed the hip out of the wound and dislocated the hip. This worked out extremely well. We then were ready for our femoral cuts. Using our neck cutting guide, we then went ahead and made a neck cut   approximately 1.5 cm proximal to the lesser trochanter based on templating. This worked out extremely well. We then used our boxed osteotome to remove the bone   laterally. We then used our trochanteric router and reamed the bone   laterally. We then sequentially broached up to 4 mm. The patient's bone quality was very good. During the broaching process, we elected to insert a prophylactic cerclage wire. We passed the cerclage wire passer underneath the vastus lateralis. We then went ahead and passed our wire without difficulty. We then tensioned the wire around the calcar this worked out extremely well. After tensioning, the wire was cut.   We then tamped down the wire. Appropriate anteversion was maintained throughout the broaching process. This gave us a nice fit and fill of the canal. We then directed our attention towards the acetabulum. We removed the labrum   in its entirety. We then split the capsule inferiorly. We   then retracted both anteriorly and posteriorly. We then were ready for   reaming. We first gently medialized with a size 48 reamer. We then went   ahead and reamed sequentially up to 55 mm, maintaining approximately   45 degrees of abduction and approximately 30 degrees of anteversion. This   worked out extremely well. We were satisfied with the preparation of the   acetabulum. We were satisfied with the nice bleeding bed of bone with good   bone. We irrigated rather copiously. We then were ready for insertion of the actual Continuum cup. We maintained appropriate anteversion and abduction. The cup was seated without difficulty. We then inserted a trial   Liner. We then went ahead and inserted our trial stem. The trial head was attached and the hip was reduced. The hip was extremely stable and leg lengths were symmetric with the 40 +8.5 head. The hip was extremely stable both anteriorly and posteriorly. We were able to flex the hip up to 90 degrees and internally rotate to approximately 60 degrees. We then went ahead and removed all trial components. We removed the trial liner. We then went ahead and seated our ultrahigh molecular weight polyethylene   liner with vitamin E. This worked out extremely well. We irrigated the   femoral canal rather copiously. We then went ahead and inserted our actual stem. We then went ahead and attached our 40 +8.5 ceramic head. This worked out extremely well. We reduced the hip. Again, the hip was stable. We irrigated all layers rather copiously. We then irrigated all layers with the Irrisept solution. We closed the capsule with interrupted figure-of-eight #1 Vicryl stitches.  We   then reattached the piriformis with interrupted vertical mattress #1 Vicryl   stitches. We then closed the gluteofemoral fascia with interrupted   figure-of-eight #1 Vicryl stitches. We then closed the subcutaneous tissues   with running strata fix. We then closed the skin with the Prineo. Sterile dressings were applied. The patient was put in an abductor pillow. There were no complications.      Electronically signed by Marco Sykes MD on 4/25/2022 at 7:10 AM

## 2022-04-25 NOTE — PROGRESS NOTES
Physical Therapy  Facility/Department: [address] ORTHOPEDICS  Physical Therapy Initial Assessment    Name: Asif Pruett  : 1955  MRN: [id number]  Date of Service: 2022    Assessment / Brendon Query Recommendations:  -good start with initial efforts mobilizing from bed to ambulate in room  -plans home tomorrow with family assist and Home PT   -he is familiar with the process from prior THR in 2016   -he is able to verbalize and is following posterior precautions   -will see in AM to assess readiness for discharge to home    Patient Diagnosis(es): Diagnoses of Osteoarthritis of right hip, unspecified osteoarthritis type and Primary osteoarthritis of right hip were pertinent to this visit. Past Medical History:  has a past medical history of Anxiety, Chronic right shoulder pain, Colon polyps, Diverticulosis of colon, Epididymitis, Essential hypertension, Hx of skin cancer, basal cell, Hyperglycemia, Idiopathic Parkinson's disease ([address] Internal hemorrhoids, Major depression single episode, in partial remission ([address] and Primary osteoarthritis involving multiple joints. Past Surgical History:  has a past surgical history that includes hernia repair (Right); hernia repair (Bilateral); [place] tooth extraction; Tooth Extraction; Total hip arthroplasty (Left, 03/15/2016); Mohs surgery (2022); Total hip arthroplasty (Right, 2022); and Total hip arthroplasty (Right, 2022). Body Structures, Functions, Activity Limitations Requiring Skilled Therapeutic Intervention: Decreased functional mobility ; Decreased ADL status; Decreased strength;Decreased balance; Increased pain  Therapy Prognosis: Good  Decision Making: Medium Complexity  Requires PT Follow-Up: Yes  Activity Tolerance  Activity Tolerance: Patient tolerated treatment well     Plan   Plan  Plan Comment: 1-4 sessions  Safety Devices  Type of Devices:  All fall risk precautions in place,Call light within reach,Chair alarm in place,Left in chair,Nurse notified Jeannie Tilley)     Restrictions  Restrictions/Precautions  Restrictions/Precautions: Weight Bearing,Fall Risk,ROM Restrictions  Lower Extremity Weight Bearing Restrictions  Right Lower Extremity Weight Bearing: Weight Bearing As Tolerated  Position Activity Restriction  Hip Precautions: No hip flexion > 90 degrees,No ADduction,No hip external rotation  Other position/activity restrictions: abduction pillow first 48 hours then use regular pillow     Subjective   General  Chart Reviewed: Yes  Patient assessed for rehabilitation services?: Yes  Additional Pertinent Hx: here for elective right THR    -had left [facility] 3/2016    PMH includes Parkinson's  Response To Previous Treatment: Not applicable  Family / Caregiver Present:  (wife and daughter)  Follows Commands: Within Functional Limits  Subjective  Subjective: arrived to room along with PT to patient resting in bed - alert oriented and agreeable to PTOT assessments and OOB to ambulate in room -states pain at 4/10  Social/Functional History  Social/Functional History  Lives With: Spouse  Type of Home: House  Home Layout: Two level,Able to Live on Main level with bedroom/bathroom  Home Access: Stairs to enter without rails  Entrance Stairs - Number of Steps: 1+1  Bathroom Shower/Tub: Shower chair with back  Bathroom Toilet: Standard (raiser with arms)  Bathroom Equipment: Grab bars in shower,Toilet raiser,Hand-held shower  Home Equipment: [employer], 4 wheeled,Cane,Crutches,Reacher,Leg ,Long-handled shoehorn  Has the patient had two or more falls in the past year or any fall with injury in the past year?: No  ADL Assistance: Independent  Homemaking Assistance: Needs assistance  Ambulation Assistance: Independent (cane or [id number])  Transfer Assistance: Independent  Additional Comments: Wife with MS and nervous regarding d/c 4/26 (provided encouragement with how well pt did with therapy today and likely no hands on assist needed at d/c. dtr to assist with getting in home.  Wife able to complete all IADL and get pt ice etc  Vision/Hearing  Vision Exceptions: Wears glasses at all times  Hearing: Within functional limits    Cognition   Orientation  Overall Orientation Status: Within Functional Limits  Cognition  Overall Cognitive Status: WFL     Objective   Observation/Palpation  Observation: abductor pillow placed between pt legs upon arrival  Bed mobility  Supine to Sit: Minimal assistance  Scooting: Contact guard assistance  Transfers  Sit to Stand: Contact guard assistance (with maintenance of posterior hip precautions)  Stand to sit: Contact guard assistance  Ambulation  Surface: level tile  Device: Rolling Walker  Assistance: Contact guard assistance  Quality of Gait: step to pattern leading with the right LE - narrow to adequate base of support  - little knee swing right or left  Distance: in room for ~40 feet overall  Comments: stable on rolling walker with cga for safety  Stairs/Curb  Stairs?: No    Balance  Comments: midline in sitting at the EOB and in static stance on the walker   -dynamic is fair on rolling walker with cga for safety   -dynamic is fair on rolling walker (limited observation)  AM-PAC Score  AM-PAC Inpatient Mobility Raw Score : 15 (04/25/22 1445)  AM-PAC Inpatient T-Scale Score : 39.45 (04/25/22 1445)  Mobility Inpatient CMS 0-100% Score: 57.7 (04/25/22 1445)  Mobility Inpatient CMS G-Code Modifier : CK (04/25/22 1445)      Goals  Short Term Goals  Time Frame for Short term goals: 1-2 days  Short term goal 1: bed mobility at [facility]  Short term goal 2: transfers at supervision/sba  Short term goal 3: ambulation at supervision/sba wbat rolling walker for household distances    -steps as needed for home entry at [facility] term goal 4: exercises at supervision  Short term goal 5: verbalize and demonstrated posterior precautions right hip  Patient Goals   Patient goals : relief of chronic right hip pain       Therapy Time   Individual Concurrent Group Co-treatment   Time In 5533         Time Out 1355         Minutes [address] [facility] [place]

## 2022-04-25 NOTE — PROGRESS NOTES
4 Eyes Skin Assessment     NAME:  Lizzette Naylor  YOB: 1955  MEDICAL RECORD NUMBER:  [id number]    The patient is being assess for  Post-Op Surgical    I agree that 2 RN's have performed a thorough Head to Toe Skin Assessment on the patient. ALL assessment sites listed below have been assessed. Areas assessed by both nurses:    Head, Face, Ears, Shoulders, Back, Chest, Arms, Elbows, Hands, Sacrum. Buttock, Coccyx, Ischium and Legs. Feet and Heels        Does the Patient have a Wound?  No noted wound(s)       Dmitry Prevention initiated:  No   Wound Care Orders initiated:  No    Pressure Injury (Stage 3,4, Unstageable, DTI, NWPT, and Complex wounds) if present place consult order under [de-identified] No    New and Established Ostomies if present place consult order under : No      Nurse 1 eSignature: Electronically signed by Filomena Hammans, RN on 4/25/22 at 11:43 AM EDT    **SHARE this note so that the co-signing nurse is able to place an eSignature**    Nurse 2 eSignature: Electronically signed by Francesca De La Torre RN on 4/25/22 at 8:06 PM EDT

## 2022-04-25 NOTE — ANESTHESIA PRE PROCEDURE
Department of Anesthesiology  Preprocedure Note       Name:  Devante Nicholas   Age:  73 y.o.  :  1955                                          MRN:  [id number]         Date:  2022      Surgeon: Jonathan Ford):  Wen Amor MD    Procedure: Procedure(s):  TOTAL HIP REPLACEMENT, RIGHT HIP    Medications prior to admission:   Prior to Admission medications    Medication Sig Start Date End Date Taking? Authorizing Provider   HYDROcodone-acetaminophen (NORCO) 5-325 MG per tablet Take 1 tablet by mouth every 6 hours as needed for Pain for up to 12 days. Intended supply: 7 days.  Take lowest dose possible to manage pain 22  Dinorah Narrow, DO   ibuprofen (ADVIL;MOTRIN) 800 MG tablet Take 1 tablet by mouth 3 times daily (with meals) 22   Antonio Up, DO   amLODIPine-benazepril (LOTREL) 5-20 MG per capsule TAKE 1 CAPSULE BY MOUTH EVERY DAY 3/20/22   Dinorah Narrow, DO   atenolol (TENORMIN) 50 MG tablet TAKE 1 TABLET BY MOUTH EVERY DAY 22   Dinorah Narrow, DO   rasagiline mesylate 1 MG TABS Take 0.5 tablets by mouth daily     Historical Provider, MD   carbidopa-levodopa (SINEMET)  MG per tablet Take 1 tablet by mouth 4 times daily    Historical Provider, MD   aspirin 81 MG tablet Take 81 mg by mouth daily    Historical Provider, MD   Multiple Vitamins-Minerals (MULTIVITAMIN ADULT PO) Take 1 tablet by mouth daily    Historical Provider, MD   Calcium Carbonate-Vitamin D (CALCIUM + D PO) Take 1 tablet by mouth daily    Historical Provider, MD   B Complex Vitamins (VITAMIN B-COMPLEX PO) Take 1 tablet by mouth daily    Historical Provider, MD       Current medications:    Current Facility-Administered Medications   Medication Dose Route Frequency Provider Last Rate Last Admin    ceFAZolin (ANCEF) 2000 mg in dextrose 5 % 100 mL IVPB  2,000 mg IntraVENous Once Wen Amor MD        0.9 % sodium chloride infusion   IntraVENous Continuous Kamaljit Campbell MD  sodium chloride flush 0.9 % injection 5-40 mL  5-40 mL IntraVENous 2 times per day Landry Odom MD        sodium chloride flush 0.9 % injection 5-40 mL  5-40 mL IntraVENous PRN Landry Odom MD        0.9 % sodium chloride infusion   IntraVENous PRN Landry Odom MD           Allergies:  No Known Allergies    Problem List:    Patient Active Problem List   Diagnosis Code    Idiopathic Parkinson's disease ([address] G20    Chronic right shoulder pain M25.511, G89.29    Essential hypertension I10    Anxiety F41.9    Primary osteoarthritis involving multiple joints M89.49    Epididymitis N45.1    Status post total replacement of left hip Z96.642    Hyperglycemia R73.9    Major depression single episode, in partial remission ([address] F32.4    Colon polyps K63.5    Diverticulosis of colon K57.30    Internal hemorrhoids K64.8    Hx of skin cancer, basal cell Z85.828       Past Medical History:        Diagnosis Date    Anxiety     Chronic right shoulder pain     Colon polyps 2017    Radha Martel MD    Diverticulosis of colon 2017    Colonoscopy    Epididymitis     Essential hypertension     Hx of skin cancer, basal cell     Face / Had Mohs surgery    Hyperglycemia     Idiopathic Parkinson's disease ([address]     Dr. Jessa Hathaway Internal hemorrhoids 2017    Colonoscopy    Major depression single episode, in partial remission ([address]     Primary osteoarthritis involving multiple joints     S/P Left Hip Replacement       Past Surgical History:        Procedure Laterality Date    HERNIA REPAIR Right     HERNIA REPAIR Bilateral     MOHS SURGERY  2022    Face    TOOTH EXTRACTION      TOTAL HIP ARTHROPLASTY Left 03/15/2016    Dr. Quevedo [facility] EXTRACTION         Social History:    Social History     Tobacco Use    Smoking status: Former Smoker     Packs/day: 1.00     Years: 6.00     Pack years: 6.00     Types: Cigarettes     Quit date: 1977     Years since quittin.0  Smokeless tobacco: Never Used   Substance Use Topics    Alcohol use: Yes     Comment: 2-3 beers daily                                Counseling given: Not Answered      Vital Signs (Current):   Vitals:    04/07/22 1612 04/25/22 0634 04/25/22 0639   BP:   121/87   Pulse:   60   Resp:   18   Temp:   97.6 °F (36.4 °C)   TempSrc:   Temporal   SpO2:   99%   Weight: 130 lb (59 kg) 124 lb (56.2 kg)    Height: 5' 10\" (1.778 m)  5' 10\" (1.778 m)                                              BP Readings from Last 3 Encounters:   04/25/22 121/87   04/19/22 122/80   04/04/22 110/82       NPO Status: Time of last liquid consumption:  (with morning medication)                        Time of last solid consumption:  (Patient states he had half of a grape to help get medication down. )                        Date of last liquid consumption: 04/25/22                        Date of last solid food consumption: 04/25/22    BMI:   Wt Readings from Last 3 Encounters:   04/25/22 124 lb (56.2 kg)   04/19/22 130 lb 9.6 oz (59.2 kg)   04/06/22 129 lb (58.5 kg)     Body mass index is 17.79 kg/m². CBC:   Lab Results   Component Value Date    WBC 9.2 04/11/2022    RBC 4.43 04/11/2022    HGB 15.2 04/11/2022    HCT 44.6 04/11/2022    .7 04/11/2022    RDW 11.9 04/11/2022     04/11/2022       CMP:   Lab Results   Component Value Date     04/11/2022    K 4.8 04/11/2022     04/11/2022    CO2 25 04/11/2022    BUN 21 04/11/2022    CREATININE 1.0 04/11/2022    GFRAA >60 04/11/2022    LABGLOM >60 04/11/2022    GLUCOSE 106 04/11/2022    CALCIUM 9.9 04/11/2022    AST 14 05/21/2014    ALT 9 05/21/2014       POC Tests: No results for input(s): POCGLU, POCNA, POCK, POCCL, POCBUN, POCHEMO, POCHCT in the last 72 hours.     Coags:   Lab Results   Component Value Date    PROTIME 10.2 04/11/2022    INR 0.91 04/11/2022       HCG (If Applicable): No results found for: PREGTESTUR, PREGSERUM, HCG, HCGQUANT     ABGs: No results found for: PHART, PO2ART, WRD9EBB, EYQ9DPY, BEART, Q5GLFPQR     Type & Screen (If Applicable):  No results found for: LABABO, LABRH    Drug/Infectious Status (If Applicable):  No results found for: HIV, HEPCAB    COVID-19 Screening (If Applicable): No results found for: COVID19        Anesthesia Evaluation  Patient summary reviewed no history of anesthetic complications:   Airway: Mallampati: II  TM distance: >3 FB   Neck ROM: full  Mouth opening: > = 3 FB Dental: normal exam         Pulmonary: breath sounds clear to auscultation      (-) asthma, shortness of breath and sleep apnea                           Cardiovascular:  Exercise tolerance: good (>4 METS),   (+) hypertension (on amlodipine-benazepril ):,     (-) past MI, CAD,  angina and  FONSECA    ECG reviewed  Rhythm: regular  Rate: normal                    Neuro/Psych:   (+) psychiatric history (parkinson's disease on levodopa): stable with treatmentdepression/anxiety    (-) TIA           GI/Hepatic/Renal:        (-) liver disease and no renal disease       Endo/Other:                     Abdominal:             Vascular: negative vascular ROS. Other Findings:           Anesthesia Plan      general     ASA 3     (GETA, PIV, Multimodal analgesia with local infiltration per surgeon, PACU. I discussed with the patient the risks and benefits to general anesthesia including possible anesthetic complications but not limited to: PONV, damage to the airway and surrounding structures (teeth, lips, gums, tongue, etc.), adverse reactions to medications, cardiac complications (MI, CHF, arrhythmias, etc.), respiratory complications (post-op ventilation, respiratory failure, etc.), neurologic complications (nerve damage, stroke, seizure), the potential for conversion to a general anesthetic, and death. The patient was given the opportunity to ask questions and all questions were answered to the patient's satisfaction.  )  Induction: intravenous.     MIPS: Postoperative opioids intended and Prophylactic antiemetics administered. Anesthetic plan and risks discussed with patient. Plan discussed with CRNA. This pre-anesthesia assessment may be used as a history and physical.    DOS STAFF ADDENDUM:    Pt seen and examined, chart reviewed (including anesthesia, drug and allergy history). No interval changes to history and physical examination. Anesthetic plan, risks, benefits, alternatives, and personnel involved discussed with patient. Patient verbalized an understanding and agrees to proceed.       Lakia Dutton MD  April 25, 2022  7:08 AM

## 2022-04-25 NOTE — PROGRESS NOTES
Occupational Therapy  Facility/Department: [address] ORTHOPEDICS  Occupational Therapy Initial Assessment    Name: Lilian Palma  : 1955  MRN: [id number]  Date of Service: 2022    Discharge Recommendations:  2-3 sessions per week,Patient would benefit from continued therapy after discharge,Home with Home health OT,24 hour supervision or assist  OT Equipment Recommendations  Equipment Needed: No     Lilian Palma scored a 18/24 on the AM-PAC ADL Inpatient form. Current research shows that an AM-PAC score of 18 or greater is typically associated with a discharge to the patient's home setting. Based on the patient's AM-PAC score, and their current ADL deficits, it is recommended that the patient have 2-3 sessions per week of Occupational Therapy at d/c to increase the patient's independence. At this time, this patient demonstrates the endurance and safety to discharge home with [address] [place] [address] (home vs OP services) and a follow up treatment frequency of 2-3x/wk. Please see assessment section for further patient specific details. If patient discharges prior to next session this note will serve as a discharge summary. Please see below for the latest assessment towards goals. Patient Diagnosis(es): The encounter diagnosis was Osteoarthritis of right hip, unspecified osteoarthritis type. Past Medical History:  has a past medical history of Anxiety, Chronic right shoulder pain, Colon polyps, Diverticulosis of colon, Epididymitis, Essential hypertension, Hx of skin cancer, basal cell, Hyperglycemia, Idiopathic Parkinson's disease ([address] Internal hemorrhoids, Major depression single episode, in partial remission ([address] and Primary osteoarthritis involving multiple joints. Past Surgical History:  has a past surgical history that includes hernia repair (Right); hernia repair (Bilateral); [place] tooth extraction; Tooth Extraction; Total hip arthroplasty (Left, 03/15/2016); Mohs surgery (2022);  Total hip arthroplasty (Right, 04/25/2022); and Total hip arthroplasty (Right, 4/25/2022). Treatment Diagnosis: impaired ADL/fxl mobility    Assessment   Performance deficits / Impairments: Decreased functional mobility ; Decreased balance;Decreased ADL status; Decreased endurance;Decreased high-level IADLs;Decreased strength  Assessment: 72 yo male admitted 4/25 for elective R CHALO- posterior approach now WBAT. PMH: HTN, Parkinson's, HTN, L CHALO 2016. PTA, pt lives with wife with assist for IADL, otherwise, IND ADL and fxl mobility with 4WW. Today, pt functioning below baseline limited by R hip pain and rigidity. Pt able to verbalize 2/3 posterior hip precautions. Pt completes bed mobility and fxl tx with CGA/Min A. pt able to complete fxl mobility with RW, toileting, and sink side grooming with CGA. Pt required Mod A LB dressing and set up seated UB dressing. Cont OT to address above deficits. Rec OT 2-3x/week to facilitate safe return home with spouse for 24 hr SUP.  Pt with needed ADL DME  Treatment Diagnosis: impaired ADL/fxl mobility  Prognosis: Good;Fair  Decision Making: Medium Complexity  REQUIRES OT FOLLOW-UP: Yes  Activity Tolerance  Activity Tolerance: Patient Tolerated treatment well;Patient limited by pain        Plan   Plan  Times per Week: 2-3 sessions  Current Treatment Recommendations: Balance training,Strengthening,Functional mobility training,Endurance training,Pain management,Safety education & training,Patient/Caregiver education & training,Positioning,Self-Care / ADL,Home management training     Restrictions  Restrictions/Precautions  Restrictions/Precautions: Weight Bearing,Fall Risk,ROM Restrictions  Lower Extremity Weight Bearing Restrictions  Right Lower Extremity Weight Bearing: Weight Bearing As Tolerated  Position Activity Restriction  Hip Precautions: No hip flexion > 90 degrees,No ADduction,No hip external rotation  Other position/activity restrictions: abduction pillow first 48 hours then use regular pillow    Subjective   General  Chart Reviewed: Yes  Patient assessed for rehabilitation services?: Yes  Additional Pertinent Hx: 74 yo male admitted 4/25 for elective R CHALO- posterior approach now WBAT. PMH: HTN, Parkinson's, HTN, L CHALO 2016  Family / Caregiver Present: Yes (wife and dtr)  Referring Practitioner: Kim Clarke MD  Diagnosis: R CHALO  Subjective  Subjective: Pt resting in bed upon arrival with abductor pillow placed. Pt agreeable to OT/PT eval. Pt reporting 4/10 R hip pain at rests, increases to 6/10 with activity.   General Comment  Comments: RN ok to see  Pain Assessment  Pain Assessment: 0-10  Pain Level: 6  Carter-Baker Pain Rating: No hurt  Patient's Stated Pain Goal: 2  Pain Location: Hip  Pain Orientation: Right  Pain Descriptors: Stabbing;Dull  Functional Pain Assessment: Prevents or interferes some active activities and ADLs  Pain Type: Surgical pain  Pain Frequency: Continuous  Pain Onset: On-going  Non-Pharmaceutical Pain Intervention(s): Rest;Repositioned  Response to Pain Intervention: Patient satisfied  Side Effects: No reported side effects  Multiple Pain Sites: No  Vital Signs  Resp: 14  Oxygen Therapy  SpO2: 96 %  Pulse Oximeter Device Mode: Intermittent  Pulse Oximeter Device Location: Finger  O2 Device: Nasal cannula  O2 Flow Rate (L/min): 2 L/min  Social/Functional History  Social/Functional History  Lives With: Spouse  Type of Home: House  Home Layout: Two level,Able to Live on Main level with bedroom/bathroom  Home Access: Stairs to enter without rails  Entrance Stairs - Number of Steps: 1+1  Bathroom Shower/Tub: Shower chair with back  Bathroom Toilet: Standard (raiser with arms)  Bathroom Equipment: Grab bars in shower,Toilet raiser,Hand-held shower  Home Equipment: Walker, 4 wheeled,Cane,Crutches,Reacher,Leg ,Long-handled shoehorn  Has the patient had two or more falls in the past year or any fall with injury in the past year?: No  ADL Assistance: Independent  Homemaking Assistance: Needs assistance  Ambulation Assistance: Independent (cane or [id number])  Transfer Assistance: Independent  Additional Comments: Wife with MS and nervous regarding d/c 4/26 (provided encouragement with how well pt did with therapy today and likely no hands on assist needed at d/c. dtr to assist with getting in home. Wife able to complete all IADL and get pt ice etc       Objective   Vision Exceptions: Wears glasses at all times  Hearing: Within functional limits       Observation/Palpation  Observation: abductor pillow placed between pt legs upon arrival  Safety Devices  Type of Devices: Call light within reach; Chair alarm in place;Gait belt;Patient at risk for falls; Left in chair;Nurse notified  Balance  Sitting Balance: Stand by assistance (seated on toilet)  Standing Balance: Contact guard assistance (pant management 3x with unilateral support on RW + sink side hand washing 2 min)  Functional Mobility  Functional - Mobility Device: Rolling Walker  Activity:  (EOB>around foot of bed>toilet>recliner)  Assist Level: Contact guard assistance  Functional Mobility Comments: mild to moderate rigidity with mild unsteadiness. However, no LOB and appropriate RW management  Toilet Transfers  Toilet - Technique: Ambulating (RW)  Equipment Used: Standard bedside commode (over toilet)  AROM: Within functional limits  Strength: Within functional limits  Coordination: Within functional limits  Tone: Normal  ADL  Grooming: Contact guard assistance (sink side hand washing)  UE Dressing: Setup (seated)  LE Dressing:  Moderate assistance (assist threading and partial pant management with steadying)  Toileting: Contact guard assistance (seated urination: able to manage pants over hips with CGA for steadying)        Bed mobility  Supine to Sit: Minimal assistance (some rigidity and increased time)  Scooting: Contact guard assistance (increased time with some rigidity)  Transfers  Sit to stand: Contact guard assistance;Minimal assistance (Min first initial stand from EOB. CGA from toilet)  Stand to sit: Contact guard assistance  Transfer Comments: RW. cues hand placement     Cognition  Overall Cognitive Status: [facility]                  Education Given To: Patient; Family  Education Provided: Role of Therapy;Plan of Care;Transfer Training;Precautions; Family Education  Education Provided Comments: posterior hip precautions. Education Method: Verbal  Education Outcome: Verbalized understanding  LUE AROM (degrees)  LUE AROM : WFL  RUE AROM (degrees)  RUE AROM : WFL                AM-PAC Score        AM-PAC Inpatient Daily Activity Raw Score: 18 (04/25/22 1416)  AM-PAC Inpatient ADL T-Scale Score : 38.66 (04/25/22 1416)  ADL Inpatient CMS 0-100% Score: 46.65 (04/25/22 1416)  ADL Inpatient CMS G-Code Modifier : CK (04/25/22 1416)    Goals  Short Term Goals  Time Frame for Short term goals: prior to d/c  Short Term Goal 1: verbalize 3/3 hip precautions  Short Term Goal 2: toileting SUP  Short Term Goal 3: LB dressing with AE SUP  Short Term Goal 4: Tolerate 5 min fxl standing task SUP  Short Term Goal 5: fxl tx and mobility with RW household distances SUP  Patient Goals   Patient goals : return home and improve pain       Therapy Time   Individual Concurrent Group Co-treatment   Time In 1315         Time Out 1355         Minutes 40         Timed Code Treatment Minutes: 25 Minutes (15 eval. 15 ADL.  10 TA)       JOHAN Mead, OTR/L

## 2022-04-25 NOTE — H&P
The patient was interviewed and examined and there have been no changes since the documented History and Physical.  I have presented reasonable alternatives to the patient's proposed care, treatment and services. The discussion I have done encompassed risks, benefits and side effects related to the alternatives and the risks related to not receiving the proposed care, treatment and services.   Electronically signed by Carrillo Muniz MD on 4/25/2022 at 7:08 AM

## 2022-04-25 NOTE — PROGRESS NOTES
Pain remains 9/10 in right hip. Repeated Dilaudid for pain. Dr. Carrasquillo Gearmanjit stopped by to see pt. Pt increasing tremors and tenseness. Administered Sinemet 1 tablet PO in PACU.

## 2022-04-25 NOTE — PROGRESS NOTES
Report called to [facility]. Room assignment called to family. Will prepare pt for transfer to room 3115.

## 2022-04-25 NOTE — PLAN OF CARE
Problem: Discharge Planning  Goal: Discharge to home or other facility with appropriate resources  Outcome: Progressing     Problem: Chronic Conditions and Co-morbidities  Goal: Patient's chronic conditions and co-morbidity symptoms are monitored and maintained or improved  Outcome: Progressing     Problem: Pain  Goal: Verbalizes/displays adequate comfort level or baseline comfort level  Outcome: Progressing   Patient educated on acute pain. Taught patient to use call light to ask for pain medication. PRN pain medication given for acute pain. Will continue to monitor pain per unit protocol. Problem: Cardiovascular - Adult  Goal: Maintains optimal cardiac output and hemodynamic stability  Outcome: Progressing     Problem: Skin/Tissue Integrity - Adult  Goal: Incisions, wounds, or drain sites healing without S/S of infection  Outcome: Progressing   Will monitor skin and mucous members. Will turn patient every 2 hours, monitor for friction and sheering, and change dressings as needed. Will preform skin assessment every shift. Problem: Musculoskeletal - Adult  Goal: Return mobility to safest level of function  Outcome: Progressing     Problem: Musculoskeletal - Adult  Goal: Maintain proper alignment of affected body part  Outcome: Progressing     Problem: Musculoskeletal - Adult  Goal: Return ADL status to a safe level of function  Outcome: Progressing     Problem: Infection - Adult  Goal: Absence of infection at discharge  Outcome: Progressing     Problem: Infection - Adult  Goal: Absence of infection during hospitalization  Outcome: Progressing     Problem: Safety - Adult  Goal: Free from fall injury  Outcome: Progressing     Problem: Skin/Tissue Integrity  Goal: Absence of new skin breakdown  Description: 1. Monitor for areas of redness and/or skin breakdown  2. Assess vascular access sites hourly  3. Every 4-6 hours minimum:  Change oxygen saturation probe site  4.   Every 4-6 hours:  If on nasal continuous positive airway pressure, respiratory therapy assess nares and determine need for appliance change or resting period.   Outcome: Progressing     Problem: ABCDS Injury Assessment  Goal: Absence of physical injury  Outcome: Progressing

## 2022-04-25 NOTE — PROGRESS NOTES
Patient has arrived to the unit with transport from PACU. Patient is resting in bed, awake and quiet. Family is at bedside. Patient is on 2L of O2 NC. Side rails are up x3. Fall precautions are in place. Bed alarm on. Bed is in lowest position. Call light, telephone and bedside table are within reach. Dressing to right hip is clean, dry and intact. Patient oriented to room and use of call light. Will continue to monitor patient per unit protocols.  Electronically signed by Evette James RN on 4/25/2022 at 11:42 AM

## 2022-04-25 NOTE — PROGRESS NOTES
Met with patient and family wife liliana at bedside, patient is alert and orientedx4. discussed role of nurse navigator. Reviewed reasons to call with questions or concerns, importance of TEDS, Incentive spirometer, pain medication, and physical and occupational therapy. 2/4 bed rails up, bed in lowest position, fall precautions in place, call light within reach. Pulses present bilaterally +2 pedal, no drainage or odor noted at surgical dressing right hip. dry Dressing clean, dry, and intact. Ice in place. Kris and scds on BLEs. Neurovascular checks performed and moderate dorsi and plantar flexions noted bilaterally, toes appear appropriate to ehtnicity in color, warm to touch, patient denies numbness or tingling. DC Plan: home with liliana and Kaia Sneed to transport patient. DME needs:needs rolling walker, agreeable to [employer] and [place] informed.       Shima Zepeda  Orthopedic Nurse Navigator  Phone number: [phone]    Future Appointments   Date Time Provider [facility]   5/10/2022 10:00 AM Rich Duncan MD [facility]   6/14/2022  9:30 AM Matt Melara DO [place]  [place] - D[facility]     Electronically signed by Araceli Sabillon RN on 4/25/2022 at 4:15 PM

## 2022-04-25 NOTE — ANESTHESIA POSTPROCEDURE EVALUATION
Department of Anesthesiology  Postprocedure Note    Patient: Renny Bah  MRN: [id number]  YOB: 1955  Date of evaluation: 4/25/2022  Time:  11:40 AM     Procedure Summary     Date: 04/25/22 Room / Location: [address] [place] [address]    Anesthesia Start: 0730 Anesthesia Stop: 0295    Procedure: TOTAL HIP REPLACEMENT, RIGHT HIP (Right Hip) Diagnosis:       Osteoarthritis of right hip, unspecified osteoarthritis type      (OSTEOARTHRITIS, RIGHT HIP)    Surgeons: Montserrat Krause MD Responsible Provider: Elian Howard MD    Anesthesia Type: general ASA Status: 3          Anesthesia Type: general    David Phase I: David Score: 9    David Phase II:      Last vitals: Reviewed and per EMR flowsheets.        Anesthesia Post Evaluation    Patient location during evaluation: PACU  Patient participation: complete - patient participated  Level of consciousness: awake  Airway patency: patent  Nausea & Vomiting: no nausea and no vomiting  Cardiovascular status: blood pressure returned to baseline  Respiratory status: acceptable  Hydration status: stable  Multimodal analgesia pain management approach

## 2022-04-25 NOTE — PROGRESS NOTES
Pt awake on arrival to PACU. Complain of pain 7/10 and increased to 9/10 in right hip. Medicated with Dilaudid by Yusuf Guo and Dev Thomas. Iv infusing well. X-rays done. . Order placed for Sinemet.  Ok per Dr. Ayaka Chacon.

## 2022-04-26 VITALS
WEIGHT: 123.68 LBS | HEIGHT: 70 IN | HEART RATE: 73 BPM | RESPIRATION RATE: 17 BRPM | BODY MASS INDEX: 17.71 KG/M2 | OXYGEN SATURATION: 93 % | DIASTOLIC BLOOD PRESSURE: 78 MMHG | TEMPERATURE: 98.5 F | SYSTOLIC BLOOD PRESSURE: 143 MMHG

## 2022-04-26 LAB
GLUCOSE BLD-MCNC: 119 MG/DL (ref 70–99)
HCT VFR BLD CALC: 33.4 % (ref 40.5–52.5)
HEMOGLOBIN: 11.5 G/DL (ref 13.5–17.5)
PERFORMED ON: ABNORMAL

## 2022-04-26 PROCEDURE — 97110 THERAPEUTIC EXERCISES: CPT

## 2022-04-26 PROCEDURE — 85014 HEMATOCRIT: CPT

## 2022-04-26 PROCEDURE — 36415 COLL VENOUS BLD VENIPUNCTURE: CPT

## 2022-04-26 PROCEDURE — 2580000003 HC RX 258: Performed by: ORTHOPAEDIC SURGERY

## 2022-04-26 PROCEDURE — 97535 SELF CARE MNGMENT TRAINING: CPT

## 2022-04-26 PROCEDURE — 6370000000 HC RX 637 (ALT 250 FOR IP): Performed by: ORTHOPAEDIC SURGERY

## 2022-04-26 PROCEDURE — G0378 HOSPITAL OBSERVATION PER HR: HCPCS

## 2022-04-26 PROCEDURE — 85018 HEMOGLOBIN: CPT

## 2022-04-26 PROCEDURE — 97530 THERAPEUTIC ACTIVITIES: CPT

## 2022-04-26 PROCEDURE — 6360000002 HC RX W HCPCS: Performed by: ORTHOPAEDIC SURGERY

## 2022-04-26 RX ADMIN — ACETAMINOPHEN 650 MG: 325 TABLET ORAL at 06:34

## 2022-04-26 RX ADMIN — LISINOPRIL 20 MG: 20 TABLET ORAL at 08:17

## 2022-04-26 RX ADMIN — OXYCODONE HYDROCHLORIDE 10 MG: 10 TABLET ORAL at 02:32

## 2022-04-26 RX ADMIN — CARBIDOPA AND LEVODOPA 1 TABLET: 25; 250 TABLET ORAL at 06:49

## 2022-04-26 RX ADMIN — OXYCODONE HYDROCHLORIDE 10 MG: 10 TABLET ORAL at 06:34

## 2022-04-26 RX ADMIN — ATENOLOL 50 MG: 50 TABLET ORAL at 08:17

## 2022-04-26 RX ADMIN — AMLODIPINE BESYLATE 5 MG: 5 TABLET ORAL at 08:18

## 2022-04-26 RX ADMIN — SENNOSIDES AND DOCUSATE SODIUM 1 TABLET: 50; 8.6 TABLET ORAL at 08:18

## 2022-04-26 RX ADMIN — ASPIRIN 81 MG: 81 TABLET, COATED ORAL at 08:17

## 2022-04-26 RX ADMIN — OXYCODONE 5 MG: 5 TABLET ORAL at 10:40

## 2022-04-26 RX ADMIN — CEFAZOLIN 2000 MG: 10 INJECTION, POWDER, FOR SOLUTION INTRAVENOUS at 00:09

## 2022-04-26 RX ADMIN — ACETAMINOPHEN 650 MG: 325 TABLET ORAL at 00:08

## 2022-04-26 RX ADMIN — SODIUM CHLORIDE, PRESERVATIVE FREE 10 ML: 5 INJECTION INTRAVENOUS at 10:00

## 2022-04-26 RX ADMIN — CARBIDOPA AND LEVODOPA 1 TABLET: 25; 250 TABLET ORAL at 10:40

## 2022-04-26 ASSESSMENT — PAIN DESCRIPTION - FREQUENCY
FREQUENCY: INTERMITTENT

## 2022-04-26 ASSESSMENT — PAIN SCALES - WONG BAKER
WONGBAKER_NUMERICALRESPONSE: 4
WONGBAKER_NUMERICALRESPONSE: 0
WONGBAKER_NUMERICALRESPONSE: 6
WONGBAKER_NUMERICALRESPONSE: 4
WONGBAKER_NUMERICALRESPONSE: 4
WONGBAKER_NUMERICALRESPONSE: 0

## 2022-04-26 ASSESSMENT — PAIN SCALES - GENERAL
PAINLEVEL_OUTOF10: 7
PAINLEVEL_OUTOF10: 5
PAINLEVEL_OUTOF10: 0
PAINLEVEL_OUTOF10: 6
PAINLEVEL_OUTOF10: 5
PAINLEVEL_OUTOF10: 4
PAINLEVEL_OUTOF10: 0
PAINLEVEL_OUTOF10: 7

## 2022-04-26 ASSESSMENT — PAIN DESCRIPTION - ORIENTATION
ORIENTATION: RIGHT

## 2022-04-26 ASSESSMENT — PAIN DESCRIPTION - LOCATION
LOCATION: HIP

## 2022-04-26 ASSESSMENT — PAIN DESCRIPTION - ONSET
ONSET: ON-GOING

## 2022-04-26 ASSESSMENT — PAIN DESCRIPTION - PAIN TYPE
TYPE: SURGICAL PAIN

## 2022-04-26 ASSESSMENT — PAIN - FUNCTIONAL ASSESSMENT
PAIN_FUNCTIONAL_ASSESSMENT: ACTIVITIES ARE NOT PREVENTED
PAIN_FUNCTIONAL_ASSESSMENT: PREVENTS OR INTERFERES SOME ACTIVE ACTIVITIES AND ADLS

## 2022-04-26 ASSESSMENT — PAIN DESCRIPTION - DESCRIPTORS
DESCRIPTORS: SHARP
DESCRIPTORS: SHARP
DESCRIPTORS: STABBING
DESCRIPTORS: SHARP
DESCRIPTORS: SHARP
DESCRIPTORS: STABBING

## 2022-04-26 NOTE — PROGRESS NOTES
Data- discharge order received, patient verbalized agreement to discharge, disposition to previous residence, needs noted for HHC/DME and informed Kunal Hurst NP. Action- discharge instructions prepared and given to patient, wife Alonzo Party, and daughter, patient, Alonzo Party, and daughter verbalized understanding. Medication information packet given r/t NEW and/or CHANGED prescriptions emphasizing name/purpose/side effects, pt verbalized understanding. Discharge instruction summary: Diet- general, Activity- wbat, Primary Care Physician as followsRuchi Murillo -[phone]. f/u appointment with orthopedic office noted below, immunizations reviewed and discussed with patient, prescription medications to be filled by retail pharmacy and then delivered. Inpatient surgical procedure precautions reviewed: posterior hip precautions. Neurovascular check performed and patient is WNLs, denies numbness/tingling in extremties. Incision site  prineo glue dressing assessed and is  clean,dry, and intact, no signs of redness, drainage, or odor noted. patient's bedside  [facility] notified of patient completing discharge instructions and iv removal. Nurse Navigator and Orthopedic Office contact information on discharge instructions and provided to patient. Response- IV removed. Medications delivered to patient via meds to bed program. Disposition is home with [place] [id number] (DME delivered to patient by Antonieta Young), to be transported with family.      Future Appointments   Date Time Provider Shelly Sethi   5/10/2022 10:00 AM Cookie Carrillo MD [facility]   6/14/2022  9:30 AM Brittani Hernandez DO [facility] [place] - DYD       Electronically signed by Richmond Reid RN on 4/26/2022 at 10:15 AM

## 2022-04-26 NOTE — PLAN OF CARE
Problem: Chronic Conditions and Co-morbidities  Goal: Patient's chronic conditions and co-morbidity symptoms are monitored and maintained or improved  4/25/2022 2255 by Keyana Melgoza RN  Outcome: Progressing  Flowsheets (Taken 4/25/2022 2020)  Care Plan - Patient's Chronic Conditions and Co-Morbidity Symptoms are Monitored and Maintained or Improved: Monitor and assess patient's chronic conditions and comorbid symptoms for stability, deterioration, or improvement  4/25/2022 1209 by Saji Godinez RN  Outcome: Progressing     Problem: Discharge Planning  Goal: Discharge to home or other facility with appropriate resources  4/25/2022 2255 by Keyana Melgoza RN  Outcome: Progressing  Flowsheets (Taken 4/25/2022 2020)  Discharge to home or other facility with appropriate resources: Identify barriers to discharge with patient and caregiver  4/25/2022 1209 by Saji Godinez RN  Outcome: Progressing     Problem: Pain  Goal: Verbalizes/displays adequate comfort level or baseline comfort level  4/25/2022 2255 by Keyana Melgoza RN  Outcome: Progressing  Flowsheets (Taken 4/25/2022 2255)  Verbalizes/displays adequate comfort level or baseline comfort level:   Encourage patient to monitor pain and request assistance   Assess pain using appropriate pain scale  4/25/2022 1209 by Saji Godinez RN  Outcome: Progressing     Problem: Cardiovascular - Adult  Goal: Maintains optimal cardiac output and hemodynamic stability  4/25/2022 2255 by Keyana Melgoza RN  Outcome: Progressing  Flowsheets (Taken 4/25/2022 2020)  Maintains optimal cardiac output and hemodynamic stability: Monitor blood pressure and heart rate  4/25/2022 1209 by Saji Godinez RN  Outcome: Progressing     Problem: Skin/Tissue Integrity - Adult  Goal: Incisions, wounds, or drain sites healing without S/S of infection  4/25/2022 2255 by Keyana Melgoza RN  Outcome: Progressing  Flowsheets (Taken 4/25/2022 2255)  Incisions, Wounds, or Drain Sites Healing Without Sign and Symptoms of Infection:   Implement wound care per orders   Initiate isolation precautions as appropriate   Initiate pressure ulcer prevention bundle as indicated  4/25/2022 1209 by Ryan Smith RN  Outcome: Progressing     Problem: Musculoskeletal - Adult  Goal: Return mobility to safest level of function  4/25/2022 2255 by Silvestre Marin RN  Outcome: Progressing  [address] (Taken 4/25/2022 2020)  Return Mobility to Safest Level of Function: Assist with transfers and ambulation using safe patient handling equipment as needed  4/25/2022 1209 by Ryan Smith RN  Outcome: Progressing  Goal: Maintain proper alignment of affected body part  4/25/2022 2255 by Silvestre Marin RN  Outcome: Progressing  Flowsheets (Taken 4/25/2022 2020)  Maintain proper alignment of affected body part: Support and protect limb and body alignment per provider's orders  4/25/2022 1209 by Ryan Smith RN  Outcome: Progressing  Goal: Return ADL status to a safe level of function  4/25/2022 2255 by Silvestre Marin RN  Outcome: Progressing  Flowsheets (Taken 4/25/2022 2020)  Return ADL Status to a Safe Level of Function: Administer medication as ordered  4/25/2022 1209 by Ryan Smith RN  Outcome: Progressing     Problem: Infection - Adult  Goal: Absence of infection at discharge  4/25/2022 2255 by Silvestre Marin RN  Outcome: Progressing  Flowsheets (Taken 4/25/2022 2020)  Absence of infection at discharge:   Assess and monitor for signs and symptoms of infection   Monitor lab/diagnostic results  4/25/2022 1209 by Ryan Smith RN  Outcome: Progressing  Goal: Absence of infection during hospitalization  4/25/2022 2255 by Silvestre Marin RN  Outcome: Progressing  Flowsheets (Taken 4/25/2022 2020)  Absence of infection during hospitalization:   Assess and monitor for signs and symptoms of infection   Monitor lab/diagnostic results  4/25/2022 1209 by Ryan Smith RN  Outcome: Progressing     Problem: Safety - Adult  Goal: Free from fall injury  4/25/2022 2255 by Claudia Taylor RN  Outcome: Progressing  Note: Pt is free of injury. No injury noted. Fall precautions in place. Call light within reach. Will monitor. 4/25/2022 1209 by Duane Forte RN  Outcome: Progressing     Problem: Skin/Tissue Integrity  Goal: Absence of new skin breakdown  Description: 1. Monitor for areas of redness and/or skin breakdown  2. Assess vascular access sites hourly  3. Every 4-6 hours minimum:  Change oxygen saturation probe site  4. Every 4-6 hours:  If on nasal continuous positive airway pressure, respiratory therapy assess nares and determine need for appliance change or resting period. 4/25/2022 2255 by Claudia Taylor RN  Outcome: Progressing  Note: Patient will be absent of new skin breakdown    4/25/2022 1209 by Duane Forte RN  Outcome: Progressing     Problem: ABCDS Injury Assessment  Goal: Absence of physical injury  4/25/2022 2255 by Claudia Taylor RN  Outcome: Progressing  Note: Pt is free of injury. No injury noted. Fall precautions in place. Call light within reach. Will monitor.      4/25/2022 1209 by Duane Forte RN  Outcome: Progressing   Electronically signed by Claudia Taylor RN on 4/25/2022 at 10:56 PM

## 2022-04-26 NOTE — PROGRESS NOTES
The patient was seen earlier this morning. He is postoperative day #1 status post right total hip arthroplasty. He already reports a significant improvement in his pain compared to preoperatively. He denies any issues. On examination, he is alert and oriented x3. His incision is clean and dry. He is neurovascularly intact distally. X-rays: The prosthesis is anatomically aligned. There is no evidence of fracture or subsidence. Lab Results   Component Value Date    HGB 11.5 (L) 04/26/2022   Acute blood loss anemia - expected after surgery. Will monitor Hgb. The patient may be discharged later this morning. The patient will follow up with me in 2 weeks and we will reassess him then. We again reviewed posterior hip precautions.

## 2022-04-26 NOTE — CARE COORDINATION
[facility]    DC order noted, all docs needed have been faxed to [facility] for home care services.     Home care to see patient within 24-48 hrs    Karli Tilley RN, BSN CTN  [facility] [phone]

## 2022-04-26 NOTE — PROGRESS NOTES
Clinical Pharmacy Note  Medication Counseling    Reviewed new medications started during hospital admission: aspirin, oxycodone and Senna-s. Indications and side effects were emphasized during counseling. All medication-related questions addressed. Patient verbalized understanding of education. Should the patient express any additional questions or concerns regarding their medications, please do not hesitate to contact the pharmacy department. Patient/caregiver aware they may refuse medications during hospital stay. 15 minutes spent educating patient regarding medications.    NAZANIN Velasquez [facility]  4/26/2022  9:46 AM

## 2022-04-26 NOTE — PROGRESS NOTES
Occupational Therapy  Facility/Department: [address] ORTHOPEDICS  Occupational Therapy Daily Treatment Note    Name: Estrada Hicks  : 1955  MRN: [id number]  Date of Service: 2022    Discharge Recommendations:  2-3 sessions per week,Patient would benefit from continued therapy after discharge,Home with Home health OT,24 hour supervision or assist     Estrada Hicks scored a 18/24 on the AM-PAC ADL Inpatient form. Current research shows that an AM-PAC score of 18 or greater is typically associated with a discharge to the patient's home setting. Based on the patient's AM-PAC score, and their current ADL deficits, it is recommended that the patient have 2-3 sessions per week of Occupational Therapy at d/c to increase the patient's independence. At this time, this patient demonstrates the endurance and safety to discharge home with [address] [place] [address] (home vs OP services) and a follow up treatment frequency of 2-3x/wk. Please see assessment section for further patient specific details. If patient discharges prior to next session this note will serve as a discharge summary. Please see below for the latest assessment towards goals. Patient Diagnosis(es): Diagnoses of Osteoarthritis of right hip, unspecified osteoarthritis type and Primary osteoarthritis of right hip were pertinent to this visit. Past Medical History:  has a past medical history of Anxiety, Chronic right shoulder pain, Colon polyps, Diverticulosis of colon, Epididymitis, Essential hypertension, Hx of skin cancer, basal cell, Hyperglycemia, Idiopathic Parkinson's disease ([address] Internal hemorrhoids, Major depression single episode, in partial remission ([address] and Primary osteoarthritis involving multiple joints. Past Surgical History:  has a past surgical history that includes hernia repair (Right); hernia repair (Bilateral); [place] tooth extraction; Tooth Extraction; Total hip arthroplasty (Left, 03/15/2016); Mohs surgery (2022);  Total hip arthroplasty (Right, 04/25/2022); and Total hip arthroplasty (Right, 4/25/2022). Treatment Diagnosis: impaired ADL/fxl mobility      Assessment   Performance deficits / Impairments: Decreased functional mobility ; Decreased balance;Decreased ADL status; Decreased endurance;Decreased high-level IADLs;Decreased strength    Assessment: Discussed am pac score of 18 with OTR, which indicates that pt is able to return home with 24 hr assistance and would benefit from [facility] to continue increasing independence, decreasing assistance levels, and decreasing caregiver burden. Pt completed sit<>stand SBA from recliner>RW>EOB>RW>recliner with mild vc's for hand placement getting up from surfaces. Pt completed fxl mobiltiy SBA with RW with mild rigidity, but no LOB noted, appropriate walker use, and slow steady pace. Pt completed bed mobility SBA and able to follow precautions during mobility with mild vc's. Pt completed LB dressing, only shoes and socks this session Min A with assistance needed to don R shoe, AE use of sock aid for donning socks. Pt left in recliner at end of session. Continue POC. Treatment Diagnosis: impaired ADL/fxl mobility  Prognosis: Good;Fair  Activity Tolerance  Activity Tolerance: Patient Tolerated treatment well  Activity Tolerance: Pt able to recall precautions during tx session and used RW appropriately throughout.         Plan   Plan  Times per Week: 2-3 sessions  Current Treatment Recommendations: Balance training,Strengthening,Functional mobility training,Endurance training,Pain management,Safety education & training,Patient/Caregiver education & training,Positioning,Self-Care / ADL,Home management training     Restrictions  Restrictions/Precautions  Restrictions/Precautions: Weight Bearing,Fall Risk,ROM Restrictions  Lower Extremity Weight Bearing Restrictions  Right Lower Extremity Weight Bearing: Weight Bearing As Tolerated  Position Activity Restriction  Hip Precautions: No hip flexion > 90 degrees,No ADduction,No hip external rotation  Other position/activity restrictions: abduction pillow first 48 hours then use regular pillow    Subjective   General  Chart Reviewed: Yes  Patient assessed for rehabilitation services?: Yes  Additional Pertinent Hx: 74 yo male admitted 4/25 for elective R CHALO- posterior approach now WBAT. PMH: HTN, Parkinson's, HTN, L CHALO 2016  Family / Caregiver Present: Yes (daughter and wife present for tx session.)  Referring Practitioner: Zeinab Ernst MD  Diagnosis: R CHALO  Subjective  Subjective: Pt resting in recliner upon arrival and agreeable to OT therapy. Pt reports R hip pain and that it's worse when moving, but doesn't report a number. General Comment  Comments: RN ok to see    Social/Functional History  Social/Functional History  Lives With: Spouse  Type of Home: House  Home Layout: Two level,Able to Live on Main level with bedroom/bathroom  Home Access: Stairs to enter without rails  Entrance Stairs - Number of Steps: 1+1  Bathroom Shower/Tub: Shower chair with back  [employer]: Standard (raiser with arms)  Bathroom Equipment: Grab bars in shower,Toilet raiser,Hand-held shower  Home Equipment: Esther Gambler, 4 wheeled,Cane,Crutches,Reacher,Leg ,Long-handled shoehorn  Has the patient had two or more falls in the past year or any fall with injury in the past year?: No  ADL Assistance: Independent  Homemaking Assistance: Needs assistance  Ambulation Assistance: Independent (cane or [id number])  Transfer Assistance: Independent  Additional Comments: Wife with MS and nervous regarding d/c 4/26 (provided encouragement with how well pt did with therapy today and likely no hands on assist needed at d/c. dtr to assist with getting in home. Wife able to complete all IADL and get pt ice etc       Objective              Safety Devices  Type of Devices: All fall risk precautions in place;Call light within reach; Chair alarm in place; Left in chair;Nurse notified  Balance  Sitting Balance: Stand by assistance  Standing Balance: Stand by assistance  Standing Balance  Time: ~1 minute  Activity: before/after ambulation with RW  Comment: no overt LOB noted. Functional Mobility  Functional - Mobility Device: Rolling Walker  Assist Level: Stand by assistance  Functional Mobility Comments: Pt rigid, but no LOB noted. Pt uses walker appropriately with slow steady pace. ADL  Equipment Provided: Sock aid;Reacher  LE Dressing: Minimal assistance  LE Dressing Skilled Clinical Factors: Only donning shoes and socks practiced this date. Pt dressed before OT session. Pt needed Min A for R shoe assistance. Additional Comments: Pt completed ADLs prior to tx session and denied neef for further ADLs this date. Bed mobility  Supine to Sit: Stand by assistance  Sit to Supine: Stand by assistance  Scooting: Stand by assistance  Comment: Pt able to complete bed mobility with mild vc's to move legs together when entering/exiting bed. Pt has good UE support and was able to keep precautions during bed mobility. Transfers  Sit to stand: Stand by assistance  Stand to sit: Stand by assistance  Transfer Comments: from recliner>RW>EOB>RW>recliner. mild vc's for hand placement on transfer surfaces, able to correct quickly and follow the rest of session. Cognition  Overall Cognitive Status: WFL                  Education Given To: Patient; Family  Education Provided: Role of Therapy;Plan of Care;Transfer Training;Precautions; Family Education;Equipment  Education Provided Comments: posterior hip precautions, the importance of prakash hose, moving every hour upon d/c home, icing, AE education  Education Method: Verbal;Demonstration  Education Outcome: Verbalized understanding;Demonstrated understanding    AM-PAC Score        AM-PAC Inpatient Daily Activity Raw Score: 18 (04/26/22 0912)  AM-PAC Inpatient ADL T-Scale Score : 38.66 (04/26/22 0912)  ADL Inpatient CMS 0-100% Score: 46.65 (04/26/22 0912)  ADL Inpatient CMS G-Code Modifier : CK (04/26/22 0912)    Goals  Short Term Goals  Time Frame for Short term goals: prior to d/c; goals ongoing  Short Term Goal 1: verbalize 3/3 hip precautions  Short Term Goal 2: toileting SUP  Short Term Goal 3: LB dressing with AE SUP  Short Term Goal 4: Tolerate 5 min fxl standing task SUP  Short Term Goal 5: fxl tx and mobility with RW household distances SUP  Patient Goals   Patient goals : return home and improve pain       Therapy Time   Individual Concurrent Group Co-treatment   Time In 0825         Time Out 0905         Minutes 40                  Hilaria Herrera S/PRINCESS  I attest that I was present for and made a skilled and mindful clinical judgement during the treatment of this patient 4/26/2022    Electronically signed by Jessica Nicole [id number] on 4/26/2022 at 9:20 AM

## 2022-04-26 NOTE — PROGRESS NOTES
Patient is A&Ox4. Patient is resting in bed, awake and quiet. Room air. Side rails are up x2. Fall precautions are in place. Bed alarm on. Bed is in lowest position. Call light, telephone and bedside table are within reach. VSS taken. Prineo dressing in place to right hip and is clean, dry and intact. PRN pain medication given. See eMAR. No needs noted at this time. AM meds given. Shift assessment completed. Will continue to monitor patient per unit protocols.  Electronically signed by Jessica Painting RN on 4/26/2022 at 11:08 AM

## 2022-04-26 NOTE — PLAN OF CARE
Problem: Discharge Planning  Goal: Discharge to home or other facility with appropriate resources  4/26/2022 1025 by Teresa Padgett RN  Outcome: Progressing     Problem: Chronic Conditions and Co-morbidities  Goal: Patient's chronic conditions and co-morbidity symptoms are monitored and maintained or improved  4/26/2022 1025 by Teresa Padgett RN  Outcome: Progressing     Problem: Pain  Goal: Verbalizes/displays adequate comfort level or baseline comfort level  4/26/2022 1025 by Teresa Padgett RN  Outcome: Progressing     Problem: Cardiovascular - Adult  Goal: Maintains optimal cardiac output and hemodynamic stability  4/26/2022 1025 by Teresa Padgett RN  Outcome: Progressing     Problem: Skin/Tissue Integrity - Adult  Goal: Incisions, wounds, or drain sites healing without S/S of infection  4/26/2022 1025 by Teresa Padgett RN  Outcome: Progressing   Will monitor skin and mucous members. Will turn patient every 2 hours, monitor for friction and sheering, and change dressings as needed. Will preform skin assessment every shift. Problem: Musculoskeletal - Adult  Goal: Return mobility to safest level of function  4/26/2022 1025 by Teresa Padgett RN  Outcome: Progressing     Problem: Musculoskeletal - Adult  Goal: Maintain proper alignment of affected body part  4/26/2022 1025 by Teresa Padgett RN  Outcome: Progressing     Problem: Musculoskeletal - Adult  Goal: Return ADL status to a safe level of function  4/26/2022 1025 by Teresa Padgett RN  Outcome: Progressing     Problem: Infection - Adult  Goal: Absence of infection at discharge  4/26/2022 1025 by Teresa Padgett RN  Outcome: Progressing     Problem: Infection - Adult  Goal: Absence of infection during hospitalization  4/26/2022 1025 by Teresa Padgett RN  Outcome: Progressing     Problem: Safety - Adult  Goal: Free from fall injury  4/26/2022 1025 by Teresa Padgett RN  Outcome: Progressing   Patient free from falls.  Fall precautions are in place. Call light, telephone and bedside table are within reach. Problem: Skin/Tissue Integrity  Goal: Absence of new skin breakdown  Description: 1. Monitor for areas of redness and/or skin breakdown  2. Assess vascular access sites hourly  3. Every 4-6 hours minimum:  Change oxygen saturation probe site  4. Every 4-6 hours:  If on nasal continuous positive airway pressure, respiratory therapy assess nares and determine need for appliance change or resting period.   4/26/2022 1025 by Aurea Joseph RN  Outcome: Progressing     Problem: ABCDS Injury Assessment  Goal: Absence of physical injury  4/26/2022 1025 by Aurea Joseph RN  Outcome: Progressing

## 2022-04-26 NOTE — PROGRESS NOTES
[facility] Orthopedic Surgery   Progress Note      S/P :  SUBJECTIVE  In recliner. Alert and oriented. States pain better than before surgery. Pain is   described in right hip and with the intensity of mild. Pain is described as aching. OBJECTIVE              Physical                      VITALS:  BP (!) 143/78   Pulse 73   Temp 98.5 °F (36.9 °C) (Oral)   Resp 17   Ht 5' 10\" (1.778 m)   Wt 123 lb 10.9 oz (56.1 kg)   SpO2 93%   BMI 17.75 kg/m²                     MUSCULOSKELETAL:  right foot NVI. Wiggles toes to command. Able to plantarflex and dorsiflex ankle Pedal pulses are palpable. NEUROLOGIC:                                  Sensory:  Touch:  Right Lower Extremity:  normal                                                 Surgical wound appears clean and dry right hip with Prineo dressing. Ice pack on. RICHAR hose on.      Data       CBC:   Lab Results   Component Value Date    WBC 9.2 04/11/2022    RBC 4.43 04/11/2022    HGB 11.5 04/26/2022    HCT 33.4 04/26/2022    .7 04/11/2022    MCH 34.3 04/11/2022    MCHC 34.1 04/11/2022    RDW 11.9 04/11/2022     04/11/2022    MPV 8.0 04/11/2022        WBC:    Lab Results   Component Value Date    WBC 9.2 04/11/2022        Hemoglobin/Hematocrit:    Lab Results   Component Value Date    HGB 11.5 04/26/2022    HCT 33.4 04/26/2022        PT/INR:    Lab Results   Component Value Date    PROTIME 10.2 04/11/2022    INR 0.91 04/11/2022              Current Inpatient Medications             Current Facility-Administered Medications: atenolol (TENORMIN) tablet 50 mg, 50 mg, Oral, Daily  glucose (GLUTOSE) 40 % oral gel 15 g, 15 g, Oral, PRN  dextrose 50 % IV solution, 12.5 g, IntraVENous, PRN  glucagon (rDNA) injection 1 mg, 1 mg, IntraMUSCular, PRN  dextrose 5 % solution, 100 mL/hr, IntraVENous, PRN  0.9 % sodium chloride infusion, , IntraVENous, Continuous  sodium chloride flush 0.9 % injection 5-40 mL, 5-40 mL, IntraVENous, 2 times per day  sodium chloride flush 0.9 % injection 5-40 mL, 5-40 mL, IntraVENous, PRN  0.9 % sodium chloride infusion, , IntraVENous, PRN  acetaminophen (TYLENOL) tablet 650 mg, 650 mg, Oral, Q6H  oxyCODONE (ROXICODONE) immediate release tablet 5 mg, 5 mg, Oral, Q4H PRN **OR** oxyCODONE HCl (OXY-IR) immediate release tablet 10 mg, 10 mg, Oral, Q4H PRN  HYDROmorphone (DILAUDID) injection 0.25 mg, 0.25 mg, IntraVENous, Q3H PRN **OR** HYDROmorphone (DILAUDID) injection 0.5 mg, 0.5 mg, IntraVENous, Q3H PRN  sennosides-docusate sodium (SENOKOT-S) 8.6-50 MG tablet 1 tablet, 1 tablet, Oral, BID  ondansetron (ZOFRAN-ODT) disintegrating tablet 4 mg, 4 mg, Oral, Q8H PRN **OR** ondansetron (ZOFRAN) injection 4 mg, 4 mg, IntraVENous, Q6H PRN  aspirin EC tablet 81 mg, 81 mg, Oral, BID  amLODIPine (NORVASC) tablet 5 mg, 5 mg, Oral, Daily **AND** lisinopril (PRINIVIL;ZESTRIL) tablet 20 mg, 20 mg, Oral, Daily  carbidopa-levodopa (SINEMET)  MG per tablet 1 tablet, 1 tablet, Oral, TID    ASSESSMENT AND PLAN      Post right CHALO, stable exam.  DVT prophylaxis ordered, ASA 81mg twice at day for 30 days for DVT prophylaxis  PT OT for ADL's and ambulation as tolerated, posterior hip precautions  SS for DC planning, home with home care today  IV or PO pain med as ordered    BRISEIDA Harvey CNP  4/26/2022  9:28 AM

## 2022-04-26 NOTE — CARE COORDINATION
4/26 spoke with patient and confirmed JET Class plan to return home with [facility] and the support of his wife. Sw made referral to [facility]- she will pull orders from Epic.     DISCHARGE SUMMARY     DATE OF DISCHARGE: 4/26/22    DISCHARGE DESTINATION: home w/ wife    HOME CARE: Yes    Agency Name: [facility]  Discharging to Facility/ Agency   · Name:  [facility] care    · Address: [address] [place] [address]., [address] [place] [address], [place] [address]  · Phone: [phone]  · Fax: [phone]     TRANSPORTATION: Private Car    NEW DME ORDERED: yes -walker per [employer]    Electronically signed by Aaron Hyde on 4/26/2022 at 10:47 AM  #[phone]

## 2022-04-26 NOTE — PROGRESS NOTES
Physical Therapy  Facility/Department: [facility] ORTHOPEDICS    Treatment note    Name: Lizzette Naylor  : 1955  MRN: [id number]  Date of Service: 2022    Assessment  / Discharge Recommendations:  -progressing well and ready for discharge to home today with family assist  -instructed in initial HEP and general activity to do until Home PT takes charge of care  -[employer] issued rolling walker for home use  -he is following posterior hip precautions well and able to verbalize   -plans Home PT and recommend Outpatient PT to follow       Patient Diagnosis(es): Diagnoses of Osteoarthritis of right hip, unspecified osteoarthritis type and Primary osteoarthritis of right hip were pertinent to this visit. Past Medical History:  has a past medical history of Anxiety, Chronic right shoulder pain, Colon polyps, Diverticulosis of colon, Epididymitis, Essential hypertension, Hx of skin cancer, basal cell, Hyperglycemia, Idiopathic Parkinson's disease ([facility] [address] Internal hemorrhoids, Major depression single episode, in partial remission ([facility] [address] and Primary osteoarthritis involving multiple joints. Past Surgical History:  has a past surgical history that includes hernia repair (Right); hernia repair (Bilateral); [place] tooth extraction; Tooth Extraction; Total hip arthroplasty (Left, 03/15/2016); Mohs surgery (2022); Total hip arthroplasty (Right, 2022); and Total hip arthroplasty (Right, 2022). Body Structures, Functions, Activity Limitations Requiring Skilled Therapeutic Intervention: Decreased functional mobility ; Decreased ADL status; Decreased strength;Decreased balance; Increased pain  Activity Tolerance  Activity Tolerance: Patient tolerated treatment well     Plan   Plan  Plan Comment: 1-4 sessions  Safety Devices  Type of Devices:  (remains in recliner with family in room to supervise - nursing informed)     Restrictions  Restrictions/Precautions  Restrictions/Precautions: Weight Bearing,Fall Risk,ROM Restrictions  Lower Extremity Weight Bearing Restrictions  Right Lower Extremity Weight Bearing: Weight Bearing As Tolerated  Position Activity Restriction  Hip Precautions: No hip flexion > 90 degrees,No ADduction,No hip external rotation  Other position/activity restrictions: abduction pillow first 48 hours then use regular pillow     Subjective   General  Chart Reviewed: Yes  Patient assessed for rehabilitation services?: Yes  Additional Pertinent Hx: here for elective right THR    -had left [facility] 3/2016    PMH includes Parkinson's  Response To Previous Treatment: Patient with no complaints from previous session. Family / Caregiver Present:  (wife and daughter)  Follows Commands: Within Functional Limits  Subjective  Subjective: to room following OT session (brief rest period to allow pain to ease) -sitting up in recliner - alert oriented and agreeable to PT session to assess readiness for home today and to instuct in HEP         Social/Functional History  Social/Functional History  Lives With: Spouse  Type of Home: House  Home Layout: Two level,Able to Live on Main level with bedroom/bathroom  Home Access: Stairs to enter without rails  Entrance Stairs - Number of Steps: 1+1  Bathroom Shower/Tub: Shower chair with back  Bathroom Toilet: Standard (raiser with arms)  Bathroom Equipment: Grab bars in shower,Toilet raiser,Hand-held shower  Home Equipment: Annecar Mendez, 4 wheeled,Cane,Crutches,Reacher,Leg ,Long-handled shoehorn  Has the patient had two or more falls in the past year or any fall with injury in the past year?: No  ADL Assistance: [facility]: Needs assistance  Ambulation Assistance: Independent (cane or [id number])  Transfer Assistance: Independent  Additional Comments: Wife with MS and nervous regarding d/c 4/26 (provided encouragement with how well pt did with therapy today and likely no hands on assist needed at d/c. dtr to assist with getting in home.  Wife able to complete

## 2022-04-26 NOTE — PROGRESS NOTES
Yodit performed this morning including Nurse navigator Faye, Physical therapist marcia, and Occupational therapist simeon. Discussed plan of care, discharge plan, and dme needs if applicable for orthopedic total joint patient.   Electronically signed by Ana Maria Ho RN on 4/26/2022 at 8:25 AM

## 2022-04-26 NOTE — PLAN OF CARE
Problem: Chronic Conditions and Co-morbidities  Goal: Patient's chronic conditions and co-morbidity symptoms are monitored and maintained or improved  4/26/2022 1016 by Demetrius Helton RN  Outcome: Adequate for Discharge  Flowsheets (Taken 4/26/2022 1016)  Care Plan - Patient's Chronic Conditions and Co-Morbidity Symptoms are Monitored and Maintained or Improved: Monitor and assess patient's chronic conditions and comorbid symptoms for stability, deterioration, or improvement  Note: Hx of anxiety noted, patient calm and cooperative, no signs of distress noted.   4/25/2022 2255 by Claudia Taylor RN  Outcome: Progressing  Flowsheets (Taken 4/25/2022 2020)  Care Plan - Patient's Chronic Conditions and Co-Morbidity Symptoms are Monitored and Maintained or Improved: Monitor and assess patient's chronic conditions and comorbid symptoms for stability, deterioration, or improvement

## 2022-04-26 NOTE — DISCHARGE SUMMARY
Physician Discharge Summary     Patient ID:  Bhavesh Palomares  [id number]  30 y.o.  1955    Admit date: 4/25/2022    Discharge date and time: 4/26/2022 11:30 AM     Admitting Physician: Sierra Sanchez MD     Discharge Physician: Jessika Sprague    Admission Diagnoses: Osteoarthritis of right hip, unspecified osteoarthritis type [M16.11]    Discharge Diagnoses: right hip OA    Admission Condition: good    Discharged Condition: good    Indication for Admission: Failed conservative treatment as outpatient for joint pain including PT and pain meds. This patient was then electively scheduled for total joint replacement surgery    Surgical procedure: right CHALO    Consults: PT OT SS    This patient had no postoperative complications. They has PT and OT for ADL's . IV and PO pain med for pain control and was eventually DC in stable condition    Treatments: analgesia,  therapies: PT OT,  and surgery      Disposition: home    Patient Instructions:   [unfilled]  Activity: activity as tolerated  Diet: regular diet  Wound Care: keep wound clean and dry    Follow-up with MARLON Escalona in 2 weeks.     Signed:  BRISEIDA Mason CNP  4/26/2022  2:40 PM

## 2022-04-26 NOTE — PROGRESS NOTES
Patient is resting in bed. Alert and oriented X4. Complaining of right hip pain, PRN pain medication given per order(see MAR). IV in place and infusing. Dressing to right hip is clean, dry, and intact. Assessment complete. All patient needs are met at this time. Fall precautions are in place. Call light is in reach. Will continue to monitor.    Electronically signed by Toshia Rogers RN on 4/25/2022 at 10:57 PM

## 2022-04-26 NOTE — PROGRESS NOTES
Dressing to right hip removed at this time. Scant amounts of serosanguinous drainage noted. Perineo remains intact. RICHAR hose, LUANN, and fresh ice applied. All needs are met at this time. Will continue to monitor.    Electronically signed by Ce Cosme RN on 4/26/2022 at 5:59 AM

## 2022-04-27 ENCOUNTER — POST-OP TELEPHONE (OUTPATIENT)
Dept: ORTHOPEDICS UNIT | Age: 67
End: 2022-04-27

## 2022-04-27 ENCOUNTER — TELEPHONE (OUTPATIENT)
Dept: ORTHOPEDIC SURGERY | Age: 67
End: 2022-04-27

## 2022-04-27 NOTE — TELEPHONE ENCOUNTER
I spoke to Estela. She states drainage is serous, half dollar in size on dressing. I advised change dry dressings. prn, and monitor. Gave verbal for PT, OT, Skilled nursing. Also, confirmed okay to take OTC Tylenol for breakthrough pain.

## 2022-04-27 NOTE — PROGRESS NOTES
Attempted to contact patient. Left hippa compliant voicemail for patient stating purpose and call back number.    Romana Chloejose  Orthopedic Nurse Navigator  Phone number: [phone]  Future Appointments   Date Time Provider Shelly Sethi   5/10/2022 10:00 AM Love Claude Luwanna Ingle, MD [facility]   6/14/2022  9:30 AM DO Ruiz Costa [facility]

## 2022-04-27 NOTE — PROGRESS NOTES
Spoke with patient regarding post discharge from hospital.    Incision status: No odor, or redness noted per patient, small amount of \"bloody\" drainage noted and gauze dressing applied per patient, encouraged patient to call us bleeding worsens or does not stop, patient verbalized understanding. Edema/Swelling/Teds: edema noted \"abuot the same as yesterday\", wearing teds - just applied recently, educated to apply early morning, patient verbalized understanding. Pain level and status: 4/10 tolerable     Use of pain medications: yes ; Patient stated they are taking their pain medication oxycodone as prescribed. Use of ice therapy: yes     Blood thinner: aspirin ; Verified with patient that they are taking their anticoagulant as prescribed twice a day. Bowels: no bm yet, passing flatus. educated patient to start a laxative an if ineffective then to contact his surgeon, patient verbalized understanding. Home Care Agency active: yes ; Claims already worked with home therapist .  Outpatient therapy: n/a    Do you have all of your medications: yes    Changes in medications: no    No other questions/concerns at this time. Encouraged patient to call Orthopedic Nurse Navigator Annie Hernández or Orthopedic office if has any questions/concerns.       Follow up appointments:    Future Appointments   Date Time Provider Shelly Sethi   5/10/2022 10:00 AM Manolo Cage MD [facility]   6/14/2022  9:30 AM DO Ruiz Hanna [facility] - [facility]     Electronically signed by Roxi Samuel RN on 4/27/2022 at 1:05 PM

## 2022-04-27 NOTE — TELEPHONE ENCOUNTER
General Question     Subject: VERBAL ORDERS  Patient and /or Facility Request: Doc Diaz"  Contact Number: [phone]    [facility]. ALSO THE PATIENT HAS A BLOODY DRAINAGE FROM RT HIP INCISION THAT STARTED LAST NIGHT. PLEASE CALL BACK DENIZ AT THE ABOVE NUMBER.

## 2022-05-01 DIAGNOSIS — M16.11 PRIMARY OSTEOARTHRITIS OF RIGHT HIP: ICD-10-CM

## 2022-05-01 RX ORDER — IBUPROFEN 800 MG/1
TABLET ORAL
Qty: 90 TABLET | Refills: 2 | Status: SHIPPED | OUTPATIENT
Start: 2022-05-01

## 2022-05-03 ENCOUNTER — TELEPHONE (OUTPATIENT)
Dept: ORTHOPEDIC SURGERY | Age: 67
End: 2022-05-03

## 2022-05-03 NOTE — TELEPHONE ENCOUNTER
Tio Marshall, RN with [facility] [phone], wanted to give update that patient is discharged from home health nursing, therapy is ongoing

## 2022-05-10 ENCOUNTER — OFFICE VISIT (OUTPATIENT)
Dept: ORTHOPEDIC SURGERY | Age: 67
End: 2022-05-10
Payer: MEDICARE

## 2022-05-10 VITALS — HEIGHT: 70 IN | BODY MASS INDEX: 18.61 KG/M2 | WEIGHT: 130 LBS

## 2022-05-10 DIAGNOSIS — Z96.641 STATUS POST TOTAL REPLACEMENT OF RIGHT HIP: Primary | ICD-10-CM

## 2022-05-10 PROCEDURE — 99024 POSTOP FOLLOW-UP VISIT: CPT | Performed by: ORTHOPAEDIC SURGERY

## 2022-05-10 NOTE — PROGRESS NOTES
Bhavesh Palomares  [id number]  May 10, 2022    Chief Complaint   Patient presents with    Post-Op Check     Rt CHALO  DOS 4/25/22             History: The patient is here in follow-up regarding his right hip. He is now 2 weeks status post right total hip arthroplasty. He reports mild pain. He is ambulating with a walker. He reports no issues with anesthesia. The patient's  past medical history, medications, allergies,  family history, social history, and review of systems have been reviewed, and dated and are recorded in the chart. Ht 5' 10\" (1.778 m)   Wt 130 lb (59 kg)   BMI 18.65 kg/m²     Physical: Mr. Bhavesh Palomares appears well, he is in no apparent distress, he demonstrates appropriate mood & affect. He is alert and oriented to person, place and time. He has mild swelling. There is No evidence of DVT seen on physical exam.. He is neurovascularly intact distally. The incision is  clean, dry and intact and without erythema. Range of motion is: 40 degrees abduction, 90 degrees flexion, 35 degrees internal rotation and 35 degrees external rotation. He has no pain with range of motion of the hip. Leg length discrepancy:none. X-Rays: AP pelvis and 2 views of the right hip were obtained and reviewed. The prosthesis is well aligned. There is no evidence of loosening or subsidence. Impression: status post right Total Hip Arthroplasty,   Doing well postoperatively. Plan: At this time, the patient will continue physical therapy. The patient will continue posterior hip precautions. Follow up will be in 4 week(s). We will then let the patient gradually get back into regular activities.

## 2022-05-12 ENCOUNTER — TELEPHONE (OUTPATIENT)
Dept: ORTHOPEDIC SURGERY | Age: 67
End: 2022-05-12

## 2022-05-12 DIAGNOSIS — M16.11 PRIMARY OSTEOARTHRITIS OF RIGHT HIP: ICD-10-CM

## 2022-05-12 RX ORDER — OXYCODONE HYDROCHLORIDE 5 MG/1
5-10 TABLET ORAL
Qty: 40 TABLET | Refills: 0 | Status: SHIPPED | OUTPATIENT
Start: 2022-05-12 | End: 2022-05-19

## 2022-05-12 NOTE — TELEPHONE ENCOUNTER
The patient is calling to get a refill on his medication-Oxycodone 5mg. The patient is wanting to change his pickup location to [facility] [address]. Please call.

## 2022-05-22 RX ORDER — ATENOLOL 50 MG/1
TABLET ORAL
Qty: 90 TABLET | Refills: 0 | Status: SHIPPED | OUTPATIENT
Start: 2022-05-22 | End: 2022-08-18

## 2022-06-07 ENCOUNTER — TELEPHONE (OUTPATIENT)
Dept: FAMILY MEDICINE CLINIC | Age: 67
End: 2022-06-07

## 2022-06-07 ENCOUNTER — TELEPHONE (OUTPATIENT)
Dept: ORTHOPEDIC SURGERY | Age: 67
End: 2022-06-07

## 2022-06-07 NOTE — TELEPHONE ENCOUNTER
Patient called stating he took a home COVID test today and it was positive. Patient states only symptom is a mild sore throat that started 3 days ago. Patient's wife was tested yesterday and diagnosed positive today. She was prescribed antiviral medication.  Patient inquiring if he should also be prescribed antiviral medication

## 2022-06-07 NOTE — TELEPHONE ENCOUNTER
General Question     Subject: pt's wife tested positive for Covid. He had an appt this morning but cxd. He wants to know when he can reschedule. He is having no symptoms.    Patient and /or Facility Request: Yolanda Rubio  Contact Number: [phone]

## 2022-06-13 ASSESSMENT — ENCOUNTER SYMPTOMS: SHORTNESS OF BREATH: 0

## 2022-06-13 NOTE — PROGRESS NOTES
Subjective:      Patient ID: Mi Villegas is a 73 y.o. male. Hypertension  This is a chronic problem. The current episode started more than 1 year ago. The problem is unchanged. The problem is controlled. Pertinent negatives include no chest pain, palpitations, peripheral edema or shortness of breath. Risk factors for coronary artery disease include family history and male gender. Past treatments include ACE inhibitors, beta blockers and calcium channel blockers. The current treatment provides significant improvement. There are no compliance problems. Parkinson's Disease:  Patient sees Dr. Nura Shannon and continues to take Sinemet  TID and Rasagiline (Azilect) 1 mg 1/2 daily. Review of Systems   Constitutional: Negative for chills and fever. Respiratory: Negative for shortness of breath. Cardiovascular: Negative for chest pain, palpitations and leg swelling. /80   Ht 5' 10\" (1.778 m)   Wt 121 lb 12.8 oz (55.2 kg)   BMI 17.48 kg/m²    Objective:   Physical Exam  Constitutional:       General: He is not in acute distress. Appearance: He is well-developed. HENT:      Head: Normocephalic. Right Ear: External ear normal.      Left Ear: External ear normal.      Mouth/Throat:      Pharynx: No oropharyngeal exudate. Neck:      Thyroid: No thyromegaly. Vascular: No JVD. Cardiovascular:      Rate and Rhythm: Normal rate and regular rhythm. Heart sounds: Normal heart sounds. No murmur heard. Pulmonary:      Effort: Pulmonary effort is normal.      Breath sounds: Normal breath sounds. No wheezing or rales. Lymphadenopathy:      Cervical: No cervical adenopathy. Neurological:      Mental Status: He is alert and oriented to person, place, and time.          Assessment:      Hypertension  Parkinson's Disease      Plan:       Chem  7, Lipid Panel, PSA  Refilled medications  RTO 6 months for Hypertension

## 2022-06-14 ENCOUNTER — OFFICE VISIT (OUTPATIENT)
Dept: FAMILY MEDICINE CLINIC | Age: 67
End: 2022-06-14
Payer: MEDICARE

## 2022-06-14 VITALS
WEIGHT: 121.8 LBS | SYSTOLIC BLOOD PRESSURE: 124 MMHG | DIASTOLIC BLOOD PRESSURE: 80 MMHG | HEIGHT: 70 IN | BODY MASS INDEX: 17.44 KG/M2

## 2022-06-14 DIAGNOSIS — G20 IDIOPATHIC PARKINSON'S DISEASE (HCC): ICD-10-CM

## 2022-06-14 DIAGNOSIS — Z12.5 SCREENING FOR PROSTATE CANCER: ICD-10-CM

## 2022-06-14 DIAGNOSIS — I10 ESSENTIAL HYPERTENSION: Primary | ICD-10-CM

## 2022-06-14 LAB
ANION GAP SERPL CALCULATED.3IONS-SCNC: 10 MMOL/L (ref 3–16)
BUN BLDV-MCNC: 13 MG/DL (ref 7–20)
CALCIUM SERPL-MCNC: 9.8 MG/DL (ref 8.3–10.6)
CHLORIDE BLD-SCNC: 102 MMOL/L (ref 99–110)
CHOLESTEROL, TOTAL: 192 MG/DL (ref 0–199)
CO2: 27 MMOL/L (ref 21–32)
CREAT SERPL-MCNC: 0.7 MG/DL (ref 0.8–1.3)
GFR AFRICAN AMERICAN: >60
GFR NON-AFRICAN AMERICAN: >60
GLUCOSE BLD-MCNC: 92 MG/DL (ref 70–99)
HDLC SERPL-MCNC: 31 MG/DL (ref 40–60)
LDL CHOLESTEROL CALCULATED: 129 MG/DL
POTASSIUM SERPL-SCNC: 5.2 MMOL/L (ref 3.5–5.1)
PROSTATE SPECIFIC ANTIGEN: 2.99 NG/ML (ref 0–4)
SODIUM BLD-SCNC: 139 MMOL/L (ref 136–145)
TRIGL SERPL-MCNC: 162 MG/DL (ref 0–150)
VLDLC SERPL CALC-MCNC: 32 MG/DL

## 2022-06-14 PROCEDURE — 36415 COLL VENOUS BLD VENIPUNCTURE: CPT | Performed by: FAMILY MEDICINE

## 2022-06-14 PROCEDURE — 99214 OFFICE O/P EST MOD 30 MIN: CPT | Performed by: FAMILY MEDICINE

## 2022-06-14 PROCEDURE — 1123F ACP DISCUSS/DSCN MKR DOCD: CPT | Performed by: FAMILY MEDICINE

## 2022-06-14 RX ORDER — AMLODIPINE BESYLATE AND BENAZEPRIL HYDROCHLORIDE 5; 20 MG/1; MG/1
CAPSULE ORAL
Qty: 90 CAPSULE | Refills: 1 | Status: SHIPPED | OUTPATIENT
Start: 2022-06-14

## 2022-06-14 ASSESSMENT — PATIENT HEALTH QUESTIONNAIRE - PHQ9
1. LITTLE INTEREST OR PLEASURE IN DOING THINGS: 0
SUM OF ALL RESPONSES TO PHQ QUESTIONS 1-9: 0
2. FEELING DOWN, DEPRESSED OR HOPELESS: 0
SUM OF ALL RESPONSES TO PHQ QUESTIONS 1-9: 0
SUM OF ALL RESPONSES TO PHQ9 QUESTIONS 1 & 2: 0
SUM OF ALL RESPONSES TO PHQ QUESTIONS 1-9: 0
SUM OF ALL RESPONSES TO PHQ QUESTIONS 1-9: 0

## 2022-06-17 ENCOUNTER — OFFICE VISIT (OUTPATIENT)
Dept: ORTHOPEDIC SURGERY | Age: 67
End: 2022-06-17

## 2022-06-17 VITALS — HEIGHT: 70 IN | WEIGHT: 122 LBS | BODY MASS INDEX: 17.47 KG/M2

## 2022-06-17 DIAGNOSIS — Z96.641 STATUS POST TOTAL REPLACEMENT OF RIGHT HIP: ICD-10-CM

## 2022-06-17 DIAGNOSIS — M16.11 PRIMARY OSTEOARTHRITIS OF RIGHT HIP: Primary | ICD-10-CM

## 2022-06-17 PROCEDURE — 99024 POSTOP FOLLOW-UP VISIT: CPT | Performed by: ORTHOPAEDIC SURGERY

## 2022-06-17 NOTE — PROGRESS NOTES
Elex Marine  [id number]  June 17, 2022    Chief Complaint   Patient presents with    Post-Op Check     R. CHALO; DOS 4/25/22             History: The patient is here in follow-up regarding his right hip. He is now 7 weeks status post right total hip arthroplasty. He reports no pain. He is ambulating without assistive devices. He reports no issues with anesthesia. He has resumed most activities. The patient's  past medical history, medications, allergies,  family history, social history, and review of systems have been reviewed, and dated and are recorded in the chart. Ht 5' 10\" (1.778 m)   Wt 122 lb (55.3 kg)   BMI 17.51 kg/m²     Physical: Mr. Adam Collins appears well, he is in no apparent distress, he demonstrates appropriate mood & affect. He is alert and oriented to person, place and time. He has mild swelling. There is No evidence of DVT seen on physical exam.. He is neurovascularly intact distally. The incision is  clean, dry and intact and without erythema. Range of motion is: 45 degrees abduction, 100 degrees flexion, 35 degrees internal rotation and 35 degrees external rotation. He has no pain with range of motion of the hip. Leg length discrepancy:none. X-Rays: AP pelvis and 2 views of the right hip obtained at last visit were again reviewed. The prosthesis is well aligned. There is no evidence of loosening or subsidence. Impression: status post right Total Hip Arthroplasty,   Doing well postoperatively. Plan: The patient will continue to work on his balance, gait and general strengthening. He will gradually return to regular activities. He may begin riding his lawnmower. He will follow-up with me in approximately 4 months and we will reassess him then. At follow-up, an AP pelvis and 2 views of the right hip will be obtained.

## 2022-08-18 RX ORDER — ATENOLOL 50 MG/1
TABLET ORAL
Qty: 90 TABLET | Refills: 1 | Status: SHIPPED | OUTPATIENT
Start: 2022-08-18

## 2022-08-25 ENCOUNTER — TELEPHONE (OUTPATIENT)
Dept: ORTHOPEDIC SURGERY | Age: 67
End: 2022-08-25

## 2022-08-25 RX ORDER — AMOXICILLIN 500 MG/1
500 TABLET, FILM COATED ORAL SEE ADMIN INSTRUCTIONS
Qty: 4 TABLET | Refills: 0 | Status: SHIPPED | OUTPATIENT
Start: 2022-08-25

## 2022-08-25 NOTE — TELEPHONE ENCOUNTER
Patient calling again with questions about Dermatology appt he has tomorrow. He will be having an excision and he is wondering is he will need antibiotics for that as well.     Please call patient to discuss

## 2022-08-25 NOTE — TELEPHONE ENCOUNTER
Prescription Refill     Medication Name:  ANTIBIOTIC [facility] DENTAL PROCEDURE)  Pharmacy: [facility] [id number] [address] [employer] - [address] Rita Serrano [phone]  Patient Contact Number:  [phone]

## 2022-10-21 ENCOUNTER — OFFICE VISIT (OUTPATIENT)
Dept: ORTHOPEDIC SURGERY | Age: 67
End: 2022-10-21
Payer: MEDICARE

## 2022-10-21 VITALS — BODY MASS INDEX: 17.51 KG/M2 | HEIGHT: 70 IN

## 2022-10-21 DIAGNOSIS — Z96.641 STATUS POST TOTAL REPLACEMENT OF RIGHT HIP: Primary | ICD-10-CM

## 2022-10-21 PROCEDURE — 1123F ACP DISCUSS/DSCN MKR DOCD: CPT | Performed by: ORTHOPAEDIC SURGERY

## 2022-10-21 PROCEDURE — 99213 OFFICE O/P EST LOW 20 MIN: CPT | Performed by: ORTHOPAEDIC SURGERY

## 2022-10-21 NOTE — PROGRESS NOTES
Akilah Chacko  [id number]  October 21, 2022    No chief complaint on file. History: The patient is here in follow-up regarding his right hip. He is now 6 months status post right total hip arthroplasty. He did do a great deal of walking over the weekend. He was [place] the [employer] [place]. He has had some mild lateral hip pain. Otherwise, he has had no issues. The patient's  past medical history, medications, allergies,  family history, social history, and review of systems have been reviewed, and dated and are recorded in the chart. Ht 5' 10\" (1.778 m)   BMI 17.51 kg/m²     Physical: Mr. Akilah Chacko appears well, he is in no apparent distress, he demonstrates appropriate mood & affect. He is alert and oriented to person, place and time. He has no swelling. There is No evidence of DVT seen on physical exam. He is neurovascularly intact distally. The incision is  clean, dry and intact and without erythema. Range of motion is: 45 degrees abduction, 100 degrees flexion, 35 degrees internal rotation and 35 degrees external rotation. He has no pain with range of motion of the hip. Leg length discrepancy:none. The patient does have very mild tenderness to palpation over the right greater trochanter. X-Rays: AP pelvis and 2 views of the right hip obtained at last visit were again reviewed. The prosthesis is well aligned. There is no evidence of loosening or subsidence. Impression: status post right Total Hip Arthroplasty,   Doing well postoperatively. Plan: The patient will continue to work on his balance, gait and general strengthening. He will gradually return to regular activities. The patient will follow up with me in approximately 6 months and we will reassess him then. At follow-up, an AP pelvis and 2 views of the right hip will be obtained. Patient was instructed to take over-the-counter ibuprofen as needed.   If he were to have continued severe lateral hip pain, we certainly could consider an injection.

## 2022-12-12 ASSESSMENT — ENCOUNTER SYMPTOMS: SHORTNESS OF BREATH: 0

## 2022-12-12 NOTE — PROGRESS NOTES
Subjective:      Patient ID: Brad Villagomez is a 78 y.o. male. Hypertension  This is a chronic problem. The current episode started more than 1 year ago. The problem is unchanged. The problem is controlled. Pertinent negatives include no chest pain, palpitations, peripheral edema or shortness of breath. Risk factors for coronary artery disease include family history and male gender. Past treatments include ACE inhibitors, beta blockers and calcium channel blockers. The current treatment provides significant improvement. There are no compliance problems. Parkinson's Disease:  Patient sees Dr. Cleaster Goodpasture and continues to take Sinemet  QID, Sinemet CR  nightly and Rasagiline (Azilect) 1 mg 1/2 daily. Review of Systems   Constitutional:  Negative for chills and fever. Respiratory:  Negative for shortness of breath. Cardiovascular:  Negative for chest pain, palpitations and leg swelling. /61   Pulse 56   Ht 5' 10\" (1.778 m)   Wt 131 lb 6.4 oz (59.6 kg)   BMI 18.85 kg/m²    Objective:   Physical Exam  Constitutional:       General: He is not in acute distress. Appearance: He is well-developed. HENT:      Head: Normocephalic. Right Ear: External ear normal.      Left Ear: External ear normal.      Mouth/Throat:      Pharynx: No oropharyngeal exudate. Neck:      Thyroid: No thyromegaly. Vascular: No JVD. Cardiovascular:      Rate and Rhythm: Normal rate and regular rhythm. Heart sounds: Normal heart sounds. No murmur heard. Pulmonary:      Effort: Pulmonary effort is normal.      Breath sounds: Normal breath sounds. No wheezing or rales. Lymphadenopathy:      Cervical: No cervical adenopathy. Neurological:      Mental Status: He is alert and oriented to person, place, and time.        Assessment:      Hypertension  Parkinson's Disease      Plan:       Refilled medications  Flu Shot Declined   RTO 6 months for Hypertension

## 2022-12-14 ENCOUNTER — OFFICE VISIT (OUTPATIENT)
Dept: FAMILY MEDICINE CLINIC | Age: 67
End: 2022-12-14
Payer: MEDICARE

## 2022-12-14 VITALS
DIASTOLIC BLOOD PRESSURE: 61 MMHG | SYSTOLIC BLOOD PRESSURE: 100 MMHG | HEIGHT: 70 IN | HEART RATE: 56 BPM | BODY MASS INDEX: 18.81 KG/M2 | WEIGHT: 131.4 LBS

## 2022-12-14 DIAGNOSIS — I10 ESSENTIAL HYPERTENSION: Primary | ICD-10-CM

## 2022-12-14 DIAGNOSIS — Z23 NEED FOR INFLUENZA VACCINATION: ICD-10-CM

## 2022-12-14 DIAGNOSIS — G20 IDIOPATHIC PARKINSON'S DISEASE (HCC): ICD-10-CM

## 2022-12-14 PROCEDURE — 3074F SYST BP LT 130 MM HG: CPT | Performed by: FAMILY MEDICINE

## 2022-12-14 PROCEDURE — 3078F DIAST BP <80 MM HG: CPT | Performed by: FAMILY MEDICINE

## 2022-12-14 PROCEDURE — 1123F ACP DISCUSS/DSCN MKR DOCD: CPT | Performed by: FAMILY MEDICINE

## 2022-12-14 PROCEDURE — 99213 OFFICE O/P EST LOW 20 MIN: CPT | Performed by: FAMILY MEDICINE

## 2022-12-14 RX ORDER — CARBIDOPA AND LEVODOPA 50; 200 MG/1; MG/1
1 TABLET, EXTENDED RELEASE ORAL NIGHTLY
COMMUNITY

## 2022-12-15 RX ORDER — AMLODIPINE BESYLATE AND BENAZEPRIL HYDROCHLORIDE 5; 20 MG/1; MG/1
CAPSULE ORAL
Qty: 90 CAPSULE | Refills: 1 | Status: SHIPPED | OUTPATIENT
Start: 2022-12-15

## 2023-02-07 RX ORDER — ATENOLOL 50 MG/1
TABLET ORAL
Qty: 90 TABLET | Refills: 0 | Status: SHIPPED | OUTPATIENT
Start: 2023-02-07

## 2023-04-21 ENCOUNTER — OFFICE VISIT (OUTPATIENT)
Dept: ORTHOPEDIC SURGERY | Age: 68
End: 2023-04-21

## 2023-04-21 VITALS — BODY MASS INDEX: 18.67 KG/M2 | WEIGHT: 130.4 LBS | HEIGHT: 70 IN

## 2023-04-21 DIAGNOSIS — Z96.641 STATUS POST TOTAL REPLACEMENT OF RIGHT HIP: Primary | ICD-10-CM

## 2023-04-21 DIAGNOSIS — M16.11 PRIMARY OSTEOARTHRITIS OF RIGHT HIP: ICD-10-CM

## 2023-05-07 RX ORDER — ATENOLOL 50 MG/1
TABLET ORAL
Qty: 90 TABLET | Refills: 0 | Status: SHIPPED | OUTPATIENT
Start: 2023-05-07

## 2023-05-18 RX ORDER — LEVODOPA AND CARBIDOPA 145; 36.25 MG/1; MG/1
3 CAPSULE, EXTENDED RELEASE ORAL 4 TIMES DAILY
COMMUNITY

## 2023-07-20 ENCOUNTER — OFFICE VISIT (OUTPATIENT)
Dept: FAMILY MEDICINE CLINIC | Age: 68
End: 2023-07-20
Payer: MEDICARE

## 2023-07-20 DIAGNOSIS — Z00.00 MEDICARE ANNUAL WELLNESS VISIT, SUBSEQUENT: Primary | ICD-10-CM

## 2023-07-20 PROCEDURE — 1123F ACP DISCUSS/DSCN MKR DOCD: CPT | Performed by: FAMILY MEDICINE

## 2023-07-20 PROCEDURE — G0439 PPPS, SUBSEQ VISIT: HCPCS | Performed by: FAMILY MEDICINE

## 2023-07-20 SDOH — ECONOMIC STABILITY: HOUSING INSECURITY
IN THE LAST 12 MONTHS, WAS THERE A TIME WHEN YOU DID NOT HAVE A STEADY PLACE TO SLEEP OR SLEPT IN A SHELTER (INCLUDING NOW)?: NO

## 2023-07-20 SDOH — ECONOMIC STABILITY: INCOME INSECURITY: HOW HARD IS IT FOR YOU TO PAY FOR THE VERY BASICS LIKE FOOD, HOUSING, MEDICAL CARE, AND HEATING?: NOT HARD AT ALL

## 2023-07-20 SDOH — HEALTH STABILITY: PHYSICAL HEALTH: ON AVERAGE, HOW MANY MINUTES DO YOU ENGAGE IN EXERCISE AT THIS LEVEL?: 60 MIN

## 2023-07-20 SDOH — ECONOMIC STABILITY: FOOD INSECURITY: WITHIN THE PAST 12 MONTHS, YOU WORRIED THAT YOUR FOOD WOULD RUN OUT BEFORE YOU GOT MONEY TO BUY MORE.: NEVER TRUE

## 2023-07-20 SDOH — HEALTH STABILITY: PHYSICAL HEALTH: ON AVERAGE, HOW MANY DAYS PER WEEK DO YOU ENGAGE IN MODERATE TO STRENUOUS EXERCISE (LIKE A BRISK WALK)?: 4 DAYS

## 2023-07-20 SDOH — ECONOMIC STABILITY: TRANSPORTATION INSECURITY
IN THE PAST 12 MONTHS, HAS LACK OF TRANSPORTATION KEPT YOU FROM MEETINGS, WORK, OR FROM GETTING THINGS NEEDED FOR DAILY LIVING?: NO

## 2023-07-20 SDOH — ECONOMIC STABILITY: FOOD INSECURITY: WITHIN THE PAST 12 MONTHS, THE FOOD YOU BOUGHT JUST DIDN'T LAST AND YOU DIDN'T HAVE MONEY TO GET MORE.: NEVER TRUE

## 2023-07-20 ASSESSMENT — PATIENT HEALTH QUESTIONNAIRE - PHQ9
2. FEELING DOWN, DEPRESSED OR HOPELESS: 0
1. LITTLE INTEREST OR PLEASURE IN DOING THINGS: 0
3. TROUBLE FALLING OR STAYING ASLEEP: 0
6. FEELING BAD ABOUT YOURSELF - OR THAT YOU ARE A FAILURE OR HAVE LET YOURSELF OR YOUR FAMILY DOWN: 0
SUM OF ALL RESPONSES TO PHQ QUESTIONS 1-9: 0
SUM OF ALL RESPONSES TO PHQ QUESTIONS 1-9: 0
5. POOR APPETITE OR OVEREATING: 0
SUM OF ALL RESPONSES TO PHQ QUESTIONS 1-9: 0
2. FEELING DOWN, DEPRESSED OR HOPELESS: 0
SUM OF ALL RESPONSES TO PHQ QUESTIONS 1-9: 0
SUM OF ALL RESPONSES TO PHQ9 QUESTIONS 1 & 2: 0
SUM OF ALL RESPONSES TO PHQ9 QUESTIONS 1 & 2: 0
7. TROUBLE CONCENTRATING ON THINGS, SUCH AS READING THE NEWSPAPER OR WATCHING TELEVISION: 0
1. LITTLE INTEREST OR PLEASURE IN DOING THINGS: 0
9. THOUGHTS THAT YOU WOULD BE BETTER OFF DEAD, OR OF HURTING YOURSELF: 0
8. MOVING OR SPEAKING SO SLOWLY THAT OTHER PEOPLE COULD HAVE NOTICED. OR THE OPPOSITE, BEING SO FIGETY OR RESTLESS THAT YOU HAVE BEEN MOVING AROUND A LOT MORE THAN USUAL: 0
4. FEELING TIRED OR HAVING LITTLE ENERGY: 0
SUM OF ALL RESPONSES TO PHQ QUESTIONS 1-9: 0
10. IF YOU CHECKED OFF ANY PROBLEMS, HOW DIFFICULT HAVE THESE PROBLEMS MADE IT FOR YOU TO DO YOUR WORK, TAKE CARE OF THINGS AT HOME, OR GET ALONG WITH OTHER PEOPLE: 0
SUM OF ALL RESPONSES TO PHQ QUESTIONS 1-9: 0

## 2023-07-20 ASSESSMENT — LIFESTYLE VARIABLES
HOW MANY STANDARD DRINKS CONTAINING ALCOHOL DO YOU HAVE ON A TYPICAL DAY: 1
HOW OFTEN DO YOU HAVE A DRINK CONTAINING ALCOHOL: 2-4 TIMES A MONTH
HOW OFTEN DO YOU HAVE A DRINK CONTAINING ALCOHOL: 2-4 TIMES A MONTH
HOW OFTEN DO YOU HAVE A DRINK CONTAINING ALCOHOL: 3
HOW MANY STANDARD DRINKS CONTAINING ALCOHOL DO YOU HAVE ON A TYPICAL DAY: 1 OR 2
HOW MANY STANDARD DRINKS CONTAINING ALCOHOL DO YOU HAVE ON A TYPICAL DAY: 1 OR 2

## 2023-07-20 NOTE — PROGRESS NOTES
Patricia Alonzo is a 80 y.o. male evaluated via telephone on 2023 for No chief complaint on file. .        Documentation:  I communicated with the patient and/or health care decision maker about (see below). Details of this discussion including any medical advice provided: (see below)    Total Time: minutes: 11-20 minutes    Patricia Alonzo was evaluated through a synchronous (real-time) audio encounter. Patient identification was verified at the start of the visit. He (or guardian if applicable) is aware that this is a billable service, which includes applicable co-pays. This visit was conducted with the patient's (and/or legal guardian's) verbal consent. He has not had a related appointment within my department in the past 7 days or scheduled within the next 24 hours. The patient was located at Home: [address] [place] [address]. The provider was located at [facility] (Appt Dept): [address] [place] [address],  [address] [place] [address]. Note: not billable if this call serves to triage the patient into an appointment for the relevant concern    Hanane Gates DO         Medicare Annual Wellness Visit  Name: Kenyon Desai Date: 2023   MRN: [id number] Sex: Male   Age: 80 y.o. Ethnicity: Non- / Non    : 1955 Race: White (non-)      Patricia Alonzo is here for No chief complaint on file. Screenings for behavioral, psychosocial and functional/safety risks, and cognitive dysfunction are all negative except as indicated below. These results, as well as otherpatient data from the Health Risk Assessment form, are documented in Flowsheets linked to this Encounter. No Known Allergies    Prior to Visit Medications    Medication Sig Taking?  Authorizing Provider   amLODIPine-benazepril (LOTREL) 5-20 MG per capsule TAKE 1 CAPSULE BY MOUTH EVERY DAY  Corbin Up DO   Carbidopa-Levodopa ER (RYTARY) 36. MG CPCR Take 3 capsules by mouth 4

## 2023-08-04 RX ORDER — ATENOLOL 50 MG/1
TABLET ORAL
Qty: 90 TABLET | Refills: 1 | Status: SHIPPED | OUTPATIENT
Start: 2023-08-04

## 2023-12-14 ENCOUNTER — OFFICE VISIT (OUTPATIENT)
Dept: FAMILY MEDICINE CLINIC | Age: 68
End: 2023-12-14
Payer: MEDICARE

## 2023-12-14 VITALS
HEIGHT: 70 IN | BODY MASS INDEX: 18.92 KG/M2 | DIASTOLIC BLOOD PRESSURE: 62 MMHG | SYSTOLIC BLOOD PRESSURE: 102 MMHG | WEIGHT: 132.2 LBS

## 2023-12-14 DIAGNOSIS — I10 ESSENTIAL HYPERTENSION: Primary | ICD-10-CM

## 2023-12-14 DIAGNOSIS — Z23 NEED FOR PNEUMOCOCCAL 20-VALENT CONJUGATE VACCINATION: ICD-10-CM

## 2023-12-14 DIAGNOSIS — G20.A1 IDIOPATHIC PARKINSON'S DISEASE: ICD-10-CM

## 2023-12-14 DIAGNOSIS — Z23 NEED FOR INFLUENZA VACCINATION: ICD-10-CM

## 2023-12-14 DIAGNOSIS — N52.9 ERECTILE DYSFUNCTION OF ORGANIC ORIGIN: ICD-10-CM

## 2023-12-14 PROCEDURE — 1123F ACP DISCUSS/DSCN MKR DOCD: CPT | Performed by: FAMILY MEDICINE

## 2023-12-14 PROCEDURE — 3078F DIAST BP <80 MM HG: CPT | Performed by: FAMILY MEDICINE

## 2023-12-14 PROCEDURE — 90677 PCV20 VACCINE IM: CPT | Performed by: FAMILY MEDICINE

## 2023-12-14 PROCEDURE — 3074F SYST BP LT 130 MM HG: CPT | Performed by: FAMILY MEDICINE

## 2023-12-14 PROCEDURE — 99213 OFFICE O/P EST LOW 20 MIN: CPT | Performed by: FAMILY MEDICINE

## 2023-12-14 PROCEDURE — G0009 ADMIN PNEUMOCOCCAL VACCINE: HCPCS | Performed by: FAMILY MEDICINE

## 2023-12-14 RX ORDER — SILDENAFIL 100 MG/1
100 TABLET, FILM COATED ORAL DAILY PRN
Qty: 6 TABLET | Refills: 5 | Status: SHIPPED | OUTPATIENT
Start: 2023-12-14

## 2023-12-14 RX ORDER — LEVODOPA AND CARBIDOPA 245; 61.25 MG/1; MG/1
CAPSULE, EXTENDED RELEASE ORAL
COMMUNITY
Start: 2023-09-20 | End: 2023-12-14

## 2023-12-17 RX ORDER — AMLODIPINE BESYLATE AND BENAZEPRIL HYDROCHLORIDE 5; 20 MG/1; MG/1
CAPSULE ORAL
Qty: 90 CAPSULE | Refills: 1 | Status: SHIPPED | OUTPATIENT
Start: 2023-12-17

## 2024-02-02 RX ORDER — ATENOLOL 50 MG/1
TABLET ORAL
Qty: 90 TABLET | Refills: 1 | OUTPATIENT
Start: 2024-02-02

## 2024-04-24 RX ORDER — AMLODIPINE BESYLATE AND BENAZEPRIL HYDROCHLORIDE 5; 20 MG/1; MG/1
CAPSULE ORAL
Qty: 90 CAPSULE | Refills: 1 | OUTPATIENT
Start: 2024-04-24

## 2024-06-12 ASSESSMENT — ENCOUNTER SYMPTOMS: SHORTNESS OF BREATH: 0

## 2024-06-12 NOTE — PROGRESS NOTES
Subjective:      Patient ID: Talib Orona is a 68 y.o. male.    Hypertension  This is a chronic problem. The current episode started more than 1 year ago. The problem is unchanged. The problem is controlled. Pertinent negatives include no chest pain, palpitations, peripheral edema or shortness of breath. Risk factors for coronary artery disease include family history and male gender. Past treatments include ACE inhibitors and calcium channel blockers. The current treatment provides significant improvement. There are no compliance problems.      Parkinson's Disease:  Patient sees Dr. Moura and continues to take Rytary 36. two QID and Rasagiline (Azilect) 1 mg 1/2 daily.      Erectile Dysfunction:  Patient takes Viagra 100 mg daily as needed.  He feels that it works well when taken.       Depression: Patient is on no antidepressant medication.  He feels that he is doing well without it.  He is sleeping well and denies any suicidal ideation.     Review of Systems   Constitutional:  Negative for chills and fever.   Respiratory:  Negative for shortness of breath.    Cardiovascular:  Negative for chest pain, palpitations and leg swelling.     /78   Wt 56.7 kg (125 lb)   BMI 17.94 kg/m²    Objective:   Physical Exam  Constitutional:       General: He is not in acute distress.     Appearance: He is well-developed.   HENT:      Head: Normocephalic.      Right Ear: External ear normal.      Left Ear: External ear normal.      Mouth/Throat:      Pharynx: No oropharyngeal exudate.   Neck:      Thyroid: No thyromegaly.      Vascular: No JVD.   Cardiovascular:      Rate and Rhythm: Normal rate and regular rhythm.      Heart sounds: Normal heart sounds. No murmur heard.  Pulmonary:      Effort: Pulmonary effort is normal.      Breath sounds: Normal breath sounds. No wheezing or rales.   Lymphadenopathy:      Cervical: No cervical adenopathy.   Neurological:      Mental Status: He is alert and oriented to

## 2024-06-13 ENCOUNTER — OFFICE VISIT (OUTPATIENT)
Dept: FAMILY MEDICINE CLINIC | Age: 69
End: 2024-06-13
Payer: MEDICARE

## 2024-06-13 VITALS — BODY MASS INDEX: 17.94 KG/M2 | WEIGHT: 125 LBS | DIASTOLIC BLOOD PRESSURE: 78 MMHG | SYSTOLIC BLOOD PRESSURE: 102 MMHG

## 2024-06-13 DIAGNOSIS — N52.9 ERECTILE DYSFUNCTION OF ORGANIC ORIGIN: ICD-10-CM

## 2024-06-13 DIAGNOSIS — G20.A1 IDIOPATHIC PARKINSON'S DISEASE (HCC): ICD-10-CM

## 2024-06-13 DIAGNOSIS — I10 ESSENTIAL HYPERTENSION: Primary | ICD-10-CM

## 2024-06-13 DIAGNOSIS — F32.4 MAJOR DEPRESSION SINGLE EPISODE, IN PARTIAL REMISSION (HCC): ICD-10-CM

## 2024-06-13 LAB
ANION GAP SERPL CALCULATED.3IONS-SCNC: 8 MMOL/L (ref 3–16)
BUN SERPL-MCNC: 15 MG/DL (ref 7–20)
CALCIUM SERPL-MCNC: 9.5 MG/DL (ref 8.3–10.6)
CHLORIDE SERPL-SCNC: 103 MMOL/L (ref 99–110)
CHOLEST SERPL-MCNC: 168 MG/DL (ref 0–199)
CO2 SERPL-SCNC: 28 MMOL/L (ref 21–32)
CREAT SERPL-MCNC: 0.7 MG/DL (ref 0.8–1.3)
GFR SERPLBLD CREATININE-BSD FMLA CKD-EPI: >90 ML/MIN/{1.73_M2}
GLUCOSE SERPL-MCNC: 101 MG/DL (ref 70–99)
HDLC SERPL-MCNC: 59 MG/DL (ref 40–60)
LDLC SERPL CALC-MCNC: 88 MG/DL
POTASSIUM SERPL-SCNC: 4.7 MMOL/L (ref 3.5–5.1)
SODIUM SERPL-SCNC: 139 MMOL/L (ref 136–145)
TRIGL SERPL-MCNC: 106 MG/DL (ref 0–150)
VLDLC SERPL CALC-MCNC: 21 MG/DL

## 2024-06-13 PROCEDURE — 1123F ACP DISCUSS/DSCN MKR DOCD: CPT | Performed by: FAMILY MEDICINE

## 2024-06-13 PROCEDURE — 99214 OFFICE O/P EST MOD 30 MIN: CPT | Performed by: FAMILY MEDICINE

## 2024-06-13 PROCEDURE — 3078F DIAST BP <80 MM HG: CPT | Performed by: FAMILY MEDICINE

## 2024-06-13 PROCEDURE — 3074F SYST BP LT 130 MM HG: CPT | Performed by: FAMILY MEDICINE

## 2024-06-13 PROCEDURE — 36415 COLL VENOUS BLD VENIPUNCTURE: CPT | Performed by: FAMILY MEDICINE

## 2024-06-13 ASSESSMENT — PATIENT HEALTH QUESTIONNAIRE - PHQ9
3. TROUBLE FALLING OR STAYING ASLEEP: NOT AT ALL
SUM OF ALL RESPONSES TO PHQ9 QUESTIONS 1 & 2: 0
9. THOUGHTS THAT YOU WOULD BE BETTER OFF DEAD, OR OF HURTING YOURSELF: NOT AT ALL
5. POOR APPETITE OR OVEREATING: NOT AT ALL
7. TROUBLE CONCENTRATING ON THINGS, SUCH AS READING THE NEWSPAPER OR WATCHING TELEVISION: NOT AT ALL
4. FEELING TIRED OR HAVING LITTLE ENERGY: NOT AT ALL
SUM OF ALL RESPONSES TO PHQ QUESTIONS 1-9: 0
1. LITTLE INTEREST OR PLEASURE IN DOING THINGS: NOT AT ALL
8. MOVING OR SPEAKING SO SLOWLY THAT OTHER PEOPLE COULD HAVE NOTICED. OR THE OPPOSITE, BEING SO FIGETY OR RESTLESS THAT YOU HAVE BEEN MOVING AROUND A LOT MORE THAN USUAL: NOT AT ALL
10. IF YOU CHECKED OFF ANY PROBLEMS, HOW DIFFICULT HAVE THESE PROBLEMS MADE IT FOR YOU TO DO YOUR WORK, TAKE CARE OF THINGS AT HOME, OR GET ALONG WITH OTHER PEOPLE: NOT DIFFICULT AT ALL
6. FEELING BAD ABOUT YOURSELF - OR THAT YOU ARE A FAILURE OR HAVE LET YOURSELF OR YOUR FAMILY DOWN: NOT AT ALL
SUM OF ALL RESPONSES TO PHQ QUESTIONS 1-9: 0
2. FEELING DOWN, DEPRESSED OR HOPELESS: NOT AT ALL
SUM OF ALL RESPONSES TO PHQ QUESTIONS 1-9: 0
SUM OF ALL RESPONSES TO PHQ QUESTIONS 1-9: 0

## 2024-06-20 RX ORDER — CIPROFLOXACIN 500 MG/1
500 TABLET, FILM COATED ORAL ONCE
Qty: 1 TABLET | Refills: 0 | Status: SHIPPED | OUTPATIENT
Start: 2024-06-20 | End: 2024-06-20

## 2024-07-09 DIAGNOSIS — F32.4 MAJOR DEPRESSION SINGLE EPISODE, IN PARTIAL REMISSION (HCC): ICD-10-CM

## 2024-07-09 DIAGNOSIS — F41.9 ANXIETY: Primary | ICD-10-CM

## 2024-07-09 RX ORDER — CLONAZEPAM 0.5 MG/1
0.5 TABLET ORAL 2 TIMES DAILY
Qty: 60 TABLET | Refills: 0 | Status: SHIPPED | OUTPATIENT
Start: 2024-07-09 | End: 2024-08-08

## 2024-07-09 RX ORDER — PAROXETINE 10 MG/1
10 TABLET, FILM COATED ORAL DAILY
Qty: 30 TABLET | Refills: 1 | Status: SHIPPED | OUTPATIENT
Start: 2024-07-09

## 2024-07-10 ENCOUNTER — PATIENT MESSAGE (OUTPATIENT)
Dept: FAMILY MEDICINE CLINIC | Age: 69
End: 2024-07-10

## 2024-07-10 NOTE — TELEPHONE ENCOUNTER
From: aTlib Orona  To: Dr. Corbin Up  Sent: 7/10/2024 2:03 PM EDT  Subject: paroxetine prescription    Dr. Up prescribed this medicine for anxiety .    I noticed in the literature about drug interactions that this med should not be taken with rasagiline. that is a prescription that I am currently taking.    Is it ok for me to take the parox for a short time?    Thanks,  RH

## 2024-07-31 RX ORDER — PAROXETINE 10 MG/1
10 TABLET, FILM COATED ORAL DAILY
Qty: 90 TABLET | Refills: 0 | Status: SHIPPED | OUTPATIENT
Start: 2024-07-31

## 2024-08-20 NOTE — PROGRESS NOTES
PM Assessed functional status (ability to engage in work or other purposeful activities, the pain intensity and its interference with activities of daily living, quality of family life and social activities, and the physical activity)                   ALYSA JUNIOR, DO

## 2024-08-21 ENCOUNTER — OFFICE VISIT (OUTPATIENT)
Dept: FAMILY MEDICINE CLINIC | Age: 69
End: 2024-08-21
Payer: MEDICARE

## 2024-08-21 VITALS
DIASTOLIC BLOOD PRESSURE: 68 MMHG | BODY MASS INDEX: 18.15 KG/M2 | SYSTOLIC BLOOD PRESSURE: 124 MMHG | HEIGHT: 70 IN | WEIGHT: 126.8 LBS

## 2024-08-21 DIAGNOSIS — F41.9 ANXIETY: ICD-10-CM

## 2024-08-21 DIAGNOSIS — F32.4 MAJOR DEPRESSION SINGLE EPISODE, IN PARTIAL REMISSION (HCC): Primary | ICD-10-CM

## 2024-08-21 PROCEDURE — 1123F ACP DISCUSS/DSCN MKR DOCD: CPT | Performed by: FAMILY MEDICINE

## 2024-08-21 PROCEDURE — 99213 OFFICE O/P EST LOW 20 MIN: CPT | Performed by: FAMILY MEDICINE

## 2024-08-21 PROCEDURE — 3074F SYST BP LT 130 MM HG: CPT | Performed by: FAMILY MEDICINE

## 2024-08-21 PROCEDURE — 3078F DIAST BP <80 MM HG: CPT | Performed by: FAMILY MEDICINE

## 2024-08-21 SDOH — ECONOMIC STABILITY: TRANSPORTATION INSECURITY
IN THE PAST 12 MONTHS, HAS LACK OF TRANSPORTATION KEPT YOU FROM MEETINGS, WORK, OR FROM GETTING THINGS NEEDED FOR DAILY LIVING?: PATIENT DECLINED

## 2024-08-21 SDOH — ECONOMIC STABILITY: INCOME INSECURITY: HOW HARD IS IT FOR YOU TO PAY FOR THE VERY BASICS LIKE FOOD, HOUSING, MEDICAL CARE, AND HEATING?: PATIENT DECLINED

## 2024-08-21 SDOH — ECONOMIC STABILITY: FOOD INSECURITY: WITHIN THE PAST 12 MONTHS, THE FOOD YOU BOUGHT JUST DIDN'T LAST AND YOU DIDN'T HAVE MONEY TO GET MORE.: PATIENT DECLINED

## 2024-08-21 SDOH — ECONOMIC STABILITY: FOOD INSECURITY: WITHIN THE PAST 12 MONTHS, YOU WORRIED THAT YOUR FOOD WOULD RUN OUT BEFORE YOU GOT MONEY TO BUY MORE.: PATIENT DECLINED

## 2024-08-21 NOTE — PATIENT INSTRUCTIONS
I recommend taking the full 10 mg of Paxil (Paroxetine) every night around bedtime.  You can take the Clonazepam twice daily as needed for anxiety.

## 2024-08-25 RX ORDER — AMLODIPINE BESYLATE AND BENAZEPRIL HYDROCHLORIDE 5; 20 MG/1; MG/1
CAPSULE ORAL
Qty: 90 CAPSULE | Refills: 0 | Status: SHIPPED | OUTPATIENT
Start: 2024-08-25

## 2024-11-03 RX ORDER — PAROXETINE 10 MG/1
10 TABLET, FILM COATED ORAL DAILY
Qty: 90 TABLET | Refills: 0 | Status: SHIPPED | OUTPATIENT
Start: 2024-11-03

## 2024-11-20 RX ORDER — AMLODIPINE AND BENAZEPRIL HYDROCHLORIDE 5; 20 MG/1; MG/1
CAPSULE ORAL
Qty: 90 CAPSULE | Refills: 0 | Status: SHIPPED | OUTPATIENT
Start: 2024-11-20

## 2024-12-08 RX ORDER — AMLODIPINE AND BENAZEPRIL HYDROCHLORIDE 5; 20 MG/1; MG/1
CAPSULE ORAL
Qty: 90 CAPSULE | Refills: 0 | OUTPATIENT
Start: 2024-12-08

## 2024-12-12 NOTE — PROGRESS NOTES
Subjective:      Patient ID: Talib Orona is a 69 y.o. male.    Hypertension  This is a chronic problem. The current episode started more than 1 year ago. The problem is unchanged. The problem is controlled. Pertinent negatives include no chest pain, palpitations, peripheral edema or shortness of breath. Risk factors for coronary artery disease include family history and male gender. Past treatments include ACE inhibitors and calcium channel blockers. The current treatment provides significant improvement. There are no compliance problems.      Depression / Anxiety: Patient stopped taking Paxil due to being hung over in the AM from it.  He is also not taking the Klonopin.  He does still have trouble sleeping and depressed mood, but is not interested in any other antidepressant medication at this time.  We discussed using Lexapro if he changes his mid.      Parkinson's Disease:  Patient sees Dr. Moura and continues to take Rytary 36. two QID, Amantadine 100 mg BID and Rasagiline (Azilect) 1 mg 1/2 daily.      Erectile Dysfunction:  Patient takes Viagra 100 mg daily as needed.  He feels that it works well when taken.       Review of Systems   Constitutional:  Negative for chills and fever.   Respiratory:  Negative for shortness of breath.    Cardiovascular:  Negative for chest pain, palpitations and leg swelling.     /72   Wt 57.2 kg (126 lb)   BMI 18.08 kg/m²    Objective:   Physical Exam  Constitutional:       General: He is not in acute distress.     Appearance: He is well-developed.   HENT:      Head: Normocephalic.      Right Ear: External ear normal.      Left Ear: External ear normal.      Mouth/Throat:      Pharynx: No oropharyngeal exudate.   Neck:      Thyroid: No thyromegaly.      Vascular: No JVD.   Cardiovascular:      Rate and Rhythm: Normal rate and regular rhythm.      Heart sounds: Normal heart sounds. No murmur heard.  Pulmonary:      Effort: Pulmonary effort is normal.

## 2024-12-17 ENCOUNTER — OFFICE VISIT (OUTPATIENT)
Dept: FAMILY MEDICINE CLINIC | Age: 69
End: 2024-12-17

## 2024-12-17 VITALS — DIASTOLIC BLOOD PRESSURE: 72 MMHG | BODY MASS INDEX: 18.08 KG/M2 | SYSTOLIC BLOOD PRESSURE: 118 MMHG | WEIGHT: 126 LBS

## 2024-12-17 DIAGNOSIS — G20.A1 IDIOPATHIC PARKINSON'S DISEASE (HCC): ICD-10-CM

## 2024-12-17 DIAGNOSIS — Z23 NEED FOR INFLUENZA VACCINATION: ICD-10-CM

## 2024-12-17 DIAGNOSIS — F32.4 MAJOR DEPRESSION SINGLE EPISODE, IN PARTIAL REMISSION (HCC): ICD-10-CM

## 2024-12-17 DIAGNOSIS — I10 ESSENTIAL HYPERTENSION: Primary | ICD-10-CM

## 2024-12-17 DIAGNOSIS — N52.9 ERECTILE DYSFUNCTION OF ORGANIC ORIGIN: ICD-10-CM

## 2024-12-17 DIAGNOSIS — F41.9 ANXIETY: ICD-10-CM

## 2024-12-17 RX ORDER — AMANTADINE HYDROCHLORIDE 100 MG/1
100 CAPSULE, GELATIN COATED ORAL 2 TIMES DAILY
COMMUNITY
Start: 2024-12-07

## 2025-02-25 RX ORDER — AMLODIPINE AND BENAZEPRIL HYDROCHLORIDE 5; 20 MG/1; MG/1
CAPSULE ORAL
Qty: 90 CAPSULE | Refills: 0 | Status: SHIPPED | OUTPATIENT
Start: 2025-02-25

## 2025-05-18 RX ORDER — AMLODIPINE AND BENAZEPRIL HYDROCHLORIDE 5; 20 MG/1; MG/1
CAPSULE ORAL DAILY
Qty: 90 CAPSULE | Refills: 0 | Status: SHIPPED | OUTPATIENT
Start: 2025-05-18

## 2025-06-24 ASSESSMENT — ENCOUNTER SYMPTOMS: SHORTNESS OF BREATH: 0

## 2025-06-24 NOTE — PROGRESS NOTES
Subjective:      Patient ID: Talib Orona is a 69 y.o. male.    Hypertension  This is a chronic problem. The current episode started more than 1 year ago. The problem is unchanged. The problem is controlled. Pertinent negatives include no chest pain, palpitations, peripheral edema or shortness of breath. Risk factors for coronary artery disease include family history and male gender. Past treatments include ACE inhibitors and calcium channel blockers. The current treatment provides significant improvement. There are no compliance problems.      Depression / Anxiety: Patient stopped taking Paxil due to being hung over in the AM from it.  He is also not taking the Klonopin.  He does still have trouble sleeping and depressed mood, but is not interested in any other antidepressant medication at this time.  We discussed using Lexapro if he changes his mind.      Parkinson's Disease:  Patient sees Dr. Moura and continues to take Rytary 36. two QID, Amantadine  two nightly and Rasagiline (Azilect) 1 mg 1/2 daily.      Erectile Dysfunction:  Patient takes Viagra 100 mg daily as needed.  He feels that it works well when taken.       Review of Systems   Constitutional:  Negative for chills and fever.   Respiratory:  Negative for shortness of breath.    Cardiovascular:  Negative for chest pain, palpitations and leg swelling.     /70   Ht 1.778 m (5' 10\")   Wt 56.2 kg (124 lb)   BMI 17.79 kg/m²    Objective:   Physical Exam  Constitutional:       General: He is not in acute distress.     Appearance: He is well-developed.   HENT:      Head: Normocephalic.      Right Ear: External ear normal.      Left Ear: External ear normal.      Mouth/Throat:      Pharynx: No oropharyngeal exudate.   Neck:      Thyroid: No thyromegaly.      Vascular: No JVD.   Cardiovascular:      Rate and Rhythm: Normal rate and regular rhythm.      Heart sounds: Normal heart sounds. No murmur heard.  Pulmonary:      Effort:

## 2025-06-25 ENCOUNTER — OFFICE VISIT (OUTPATIENT)
Dept: FAMILY MEDICINE CLINIC | Age: 70
End: 2025-06-25

## 2025-06-25 VITALS
HEIGHT: 70 IN | SYSTOLIC BLOOD PRESSURE: 120 MMHG | WEIGHT: 124 LBS | DIASTOLIC BLOOD PRESSURE: 70 MMHG | BODY MASS INDEX: 17.75 KG/M2

## 2025-06-25 DIAGNOSIS — F41.9 ANXIETY: ICD-10-CM

## 2025-06-25 DIAGNOSIS — F32.4 MAJOR DEPRESSION SINGLE EPISODE, IN PARTIAL REMISSION: ICD-10-CM

## 2025-06-25 DIAGNOSIS — G20.A1 IDIOPATHIC PARKINSON'S DISEASE (HCC): ICD-10-CM

## 2025-06-25 DIAGNOSIS — N52.9 ERECTILE DYSFUNCTION OF ORGANIC ORIGIN: ICD-10-CM

## 2025-06-25 DIAGNOSIS — I10 ESSENTIAL HYPERTENSION: Primary | ICD-10-CM

## 2025-06-25 LAB
ANION GAP SERPL CALCULATED.3IONS-SCNC: 12 MMOL/L (ref 3–16)
BUN SERPL-MCNC: 18 MG/DL (ref 7–20)
CALCIUM SERPL-MCNC: 9 MG/DL (ref 8.3–10.6)
CHLORIDE SERPL-SCNC: 104 MMOL/L (ref 99–110)
CHOLEST SERPL-MCNC: 172 MG/DL (ref 0–199)
CO2 SERPL-SCNC: 21 MMOL/L (ref 21–32)
CREAT SERPL-MCNC: 0.8 MG/DL (ref 0.8–1.3)
GFR SERPLBLD CREATININE-BSD FMLA CKD-EPI: >90 ML/MIN/{1.73_M2}
GLUCOSE SERPL-MCNC: 84 MG/DL (ref 70–99)
HDLC SERPL-MCNC: 69 MG/DL (ref 40–60)
LDLC SERPL CALC-MCNC: 88 MG/DL
POTASSIUM SERPL-SCNC: 4.5 MMOL/L (ref 3.5–5.1)
SODIUM SERPL-SCNC: 137 MMOL/L (ref 136–145)
TRIGL SERPL-MCNC: 77 MG/DL (ref 0–150)
VLDLC SERPL CALC-MCNC: 15 MG/DL

## 2025-06-25 RX ORDER — AMANTADINE 137 MG/1
2 CAPSULE, COATED PELLETS ORAL NIGHTLY
COMMUNITY

## 2025-06-25 RX ORDER — AMLODIPINE AND BENAZEPRIL HYDROCHLORIDE 5; 20 MG/1; MG/1
1 CAPSULE ORAL DAILY
Qty: 90 CAPSULE | Refills: 1 | Status: SHIPPED | OUTPATIENT
Start: 2025-06-25

## 2025-06-25 SDOH — ECONOMIC STABILITY: FOOD INSECURITY: WITHIN THE PAST 12 MONTHS, THE FOOD YOU BOUGHT JUST DIDN'T LAST AND YOU DIDN'T HAVE MONEY TO GET MORE.: NEVER TRUE

## 2025-06-25 SDOH — ECONOMIC STABILITY: FOOD INSECURITY: WITHIN THE PAST 12 MONTHS, YOU WORRIED THAT YOUR FOOD WOULD RUN OUT BEFORE YOU GOT MONEY TO BUY MORE.: NEVER TRUE

## 2025-06-25 ASSESSMENT — PATIENT HEALTH QUESTIONNAIRE - PHQ9
8. MOVING OR SPEAKING SO SLOWLY THAT OTHER PEOPLE COULD HAVE NOTICED. OR THE OPPOSITE, BEING SO FIGETY OR RESTLESS THAT YOU HAVE BEEN MOVING AROUND A LOT MORE THAN USUAL: NOT AT ALL
SUM OF ALL RESPONSES TO PHQ QUESTIONS 1-9: 0
10. IF YOU CHECKED OFF ANY PROBLEMS, HOW DIFFICULT HAVE THESE PROBLEMS MADE IT FOR YOU TO DO YOUR WORK, TAKE CARE OF THINGS AT HOME, OR GET ALONG WITH OTHER PEOPLE: NOT DIFFICULT AT ALL
SUM OF ALL RESPONSES TO PHQ QUESTIONS 1-9: 0
6. FEELING BAD ABOUT YOURSELF - OR THAT YOU ARE A FAILURE OR HAVE LET YOURSELF OR YOUR FAMILY DOWN: NOT AT ALL
3. TROUBLE FALLING OR STAYING ASLEEP: NOT AT ALL
SUM OF ALL RESPONSES TO PHQ QUESTIONS 1-9: 0
2. FEELING DOWN, DEPRESSED OR HOPELESS: NOT AT ALL
1. LITTLE INTEREST OR PLEASURE IN DOING THINGS: NOT AT ALL
SUM OF ALL RESPONSES TO PHQ QUESTIONS 1-9: 0
5. POOR APPETITE OR OVEREATING: NOT AT ALL
4. FEELING TIRED OR HAVING LITTLE ENERGY: NOT AT ALL
9. THOUGHTS THAT YOU WOULD BE BETTER OFF DEAD, OR OF HURTING YOURSELF: NOT AT ALL
7. TROUBLE CONCENTRATING ON THINGS, SUCH AS READING THE NEWSPAPER OR WATCHING TELEVISION: NOT AT ALL

## 2025-06-26 ENCOUNTER — RESULTS FOLLOW-UP (OUTPATIENT)
Dept: FAMILY MEDICINE CLINIC | Age: 70
End: 2025-06-26

## (undated) DEVICE — GLOVE SURG SZ 85 L12IN FNGR THK94MIL STD WHT LTX FREE

## (undated) DEVICE — GARMENT COMPR STD FOR 17IN CALF UNIF THER FLOTRN

## (undated) DEVICE — SUTURE STRATAFIX SPRL SZ 3-0 L12IN ABSRB UD FS-1 L30X30CM SXMP2B410

## (undated) DEVICE — SOLUTION IV IRRIG POUR BRL 0.9% SODIUM CHL 2F7124

## (undated) DEVICE — NEEDLE HYPO 22GA L1 1/2IN PIVOTING SHLD FOR LUERLOCK SYR

## (undated) DEVICE — HANDPIECE SET WITH HIGH FLOW TIP AND SUCTION TUBE: Brand: INTERPULSE

## (undated) DEVICE — PAD,NON-ADHERENT,3X8,STERILE,LF,1/PK: Brand: MEDLINE

## (undated) DEVICE — SUTURE VCRL + 1 L27IN ABSRB UD CT-1 L36MM 1/2 CIR TAPR PNT VCP261H

## (undated) DEVICE — SUTURE VCRL + SZ 1 L18IN ABSRB UD L36MM CT-1 1/2 CIR VCP841D

## (undated) DEVICE — SOLUTION IRRIG 3000ML 0.9% SOD CHL USP UROMATIC PLAS CONT

## (undated) DEVICE — TOTAL HIP: Brand: MEDLINE INDUSTRIES, INC.

## (undated) DEVICE — PAD,ABDOMINAL,8"X7.5",STERILE,LF,1/PK: Brand: MEDLINE

## (undated) DEVICE — ELECTRODE BLDE L6.5IN CAUT EXT DISP

## (undated) DEVICE — ANTI-EMBOLISM STOCKINGS,THIGH LENGTH,LARGE-LONG-SIZE J: Brand: T.E.D.

## (undated) DEVICE — DRAPE,HIP,W/POUCHES,STERILE: Brand: MEDLINE

## (undated) DEVICE — DRAPE,REIN 53X77,STERILE: Brand: MEDLINE

## (undated) DEVICE — GOWN SIRUS NONREIN XL W/TWL: Brand: MEDLINE INDUSTRIES, INC.

## (undated) DEVICE — SPONGE GZ W4XL4IN COT 12 PLY TYP VII WVN C FLD DSGN

## (undated) DEVICE — PILLOW POS W15XH6XL22IN RASPBERRY FOAM ABD W/ STRP DISP FOR

## (undated) DEVICE — 450 ML BOTTLE OF 0.05% CHLORHEXIDINE GLUCONATE IN 99.95% STERILE WATER FOR IRRIGATION, USP AND APPLICATOR.: Brand: IRRISEPT ANTIMICROBIAL WOUND LAVAGE

## (undated) DEVICE — SUTURE VCRL + SZ 2-0 L18IN ABSRB UD CT1 L36MM 1/2 CIR VCP839D

## (undated) DEVICE — CLEANER,CAUTERY TIP,2X2",STERILE: Brand: MEDLINE

## (undated) DEVICE — GLOVE ORANGE PI 8 1/2   MSG9085

## (undated) DEVICE — 3M™ STERI-DRAPE™ U-DRAPE 1015: Brand: STERI-DRAPE™

## (undated) DEVICE — 1010 S-DRAPE TOWEL DRAPE 10/BX: Brand: STERI-DRAPE™